# Patient Record
Sex: MALE | Race: BLACK OR AFRICAN AMERICAN | Employment: UNEMPLOYED | URBAN - METROPOLITAN AREA
[De-identification: names, ages, dates, MRNs, and addresses within clinical notes are randomized per-mention and may not be internally consistent; named-entity substitution may affect disease eponyms.]

---

## 2018-08-21 ENCOUNTER — HOSPITAL ENCOUNTER (EMERGENCY)
Age: 53
Discharge: ANOTHER HOSPITAL | End: 2018-08-22
Attending: EMERGENCY MEDICINE
Payer: MEDICARE

## 2018-08-21 ENCOUNTER — APPOINTMENT (OUTPATIENT)
Dept: GENERAL RADIOLOGY | Age: 53
End: 2018-08-21
Attending: PHYSICIAN ASSISTANT
Payer: MEDICARE

## 2018-08-21 ENCOUNTER — APPOINTMENT (OUTPATIENT)
Dept: CT IMAGING | Age: 53
End: 2018-08-21
Attending: EMERGENCY MEDICINE
Payer: MEDICARE

## 2018-08-21 ENCOUNTER — APPOINTMENT (OUTPATIENT)
Dept: CT IMAGING | Age: 53
End: 2018-08-21
Attending: PHYSICIAN ASSISTANT
Payer: MEDICARE

## 2018-08-21 DIAGNOSIS — R46.89 AGGRESSIVE BEHAVIOR: Primary | ICD-10-CM

## 2018-08-21 DIAGNOSIS — C71.9 GLIOMA OF BRAIN (HCC): ICD-10-CM

## 2018-08-21 LAB
ALBUMIN SERPL-MCNC: 3.5 G/DL (ref 3.5–5)
ALBUMIN/GLOB SERPL: 0.8 {RATIO} (ref 1.1–2.2)
ALP SERPL-CCNC: 85 U/L (ref 45–117)
ALT SERPL-CCNC: 15 U/L (ref 12–78)
AMPHET UR QL SCN: NEGATIVE
ANION GAP SERPL CALC-SCNC: 8 MMOL/L (ref 5–15)
APPEARANCE UR: CLEAR
AST SERPL-CCNC: 15 U/L (ref 15–37)
BACTERIA URNS QL MICRO: NEGATIVE /HPF
BARBITURATES UR QL SCN: NEGATIVE
BASOPHILS # BLD: 0.1 K/UL (ref 0–0.1)
BASOPHILS NFR BLD: 1 % (ref 0–1)
BENZODIAZ UR QL: NEGATIVE
BILIRUB SERPL-MCNC: 0.2 MG/DL (ref 0.2–1)
BILIRUB UR QL: NEGATIVE
BUN SERPL-MCNC: 9 MG/DL (ref 6–20)
BUN/CREAT SERPL: 8 (ref 12–20)
CALCIUM SERPL-MCNC: 9.7 MG/DL (ref 8.5–10.1)
CANNABINOIDS UR QL SCN: POSITIVE
CHLORIDE SERPL-SCNC: 100 MMOL/L (ref 97–108)
CO2 SERPL-SCNC: 29 MMOL/L (ref 21–32)
COCAINE UR QL SCN: POSITIVE
COLOR UR: NORMAL
CREAT SERPL-MCNC: 1.08 MG/DL (ref 0.7–1.3)
DIFFERENTIAL METHOD BLD: ABNORMAL
DRUG SCRN COMMENT,DRGCM: ABNORMAL
EOSINOPHIL # BLD: 0.2 K/UL (ref 0–0.4)
EOSINOPHIL NFR BLD: 1 % (ref 0–7)
EPITH CASTS URNS QL MICRO: NORMAL /LPF
ERYTHROCYTE [DISTWIDTH] IN BLOOD BY AUTOMATED COUNT: 13.5 % (ref 11.5–14.5)
ETHANOL SERPL-MCNC: <10 MG/DL
GLOBULIN SER CALC-MCNC: 4.2 G/DL (ref 2–4)
GLUCOSE SERPL-MCNC: 103 MG/DL (ref 65–100)
GLUCOSE UR STRIP.AUTO-MCNC: NEGATIVE MG/DL
HCT VFR BLD AUTO: 40.8 % (ref 36.6–50.3)
HGB BLD-MCNC: 13.5 G/DL (ref 12.1–17)
HGB UR QL STRIP: NEGATIVE
IMM GRANULOCYTES # BLD: 0.1 K/UL (ref 0–0.04)
IMM GRANULOCYTES NFR BLD AUTO: 0 % (ref 0–0.5)
KETONES UR QL STRIP.AUTO: NEGATIVE MG/DL
LEUKOCYTE ESTERASE UR QL STRIP.AUTO: NEGATIVE
LYMPHOCYTES # BLD: 3.4 K/UL (ref 0.8–3.5)
LYMPHOCYTES NFR BLD: 24 % (ref 12–49)
MCH RBC QN AUTO: 29.3 PG (ref 26–34)
MCHC RBC AUTO-ENTMCNC: 33.1 G/DL (ref 30–36.5)
MCV RBC AUTO: 88.5 FL (ref 80–99)
METHADONE UR QL: NEGATIVE
MONOCYTES # BLD: 0.9 K/UL (ref 0–1)
MONOCYTES NFR BLD: 6 % (ref 5–13)
NEUTS SEG # BLD: 9.5 K/UL (ref 1.8–8)
NEUTS SEG NFR BLD: 67 % (ref 32–75)
NITRITE UR QL STRIP.AUTO: NEGATIVE
NRBC # BLD: 0 K/UL (ref 0–0.01)
NRBC BLD-RTO: 0 PER 100 WBC
OPIATES UR QL: NEGATIVE
PCP UR QL: NEGATIVE
PH UR STRIP: 6.5 [PH] (ref 5–8)
PLATELET # BLD AUTO: 361 K/UL (ref 150–400)
PMV BLD AUTO: 9.3 FL (ref 8.9–12.9)
POTASSIUM SERPL-SCNC: 4.1 MMOL/L (ref 3.5–5.1)
PROT SERPL-MCNC: 7.7 G/DL (ref 6.4–8.2)
PROT UR STRIP-MCNC: NEGATIVE MG/DL
RBC # BLD AUTO: 4.61 M/UL (ref 4.1–5.7)
RBC #/AREA URNS HPF: NORMAL /HPF (ref 0–5)
SODIUM SERPL-SCNC: 137 MMOL/L (ref 136–145)
SP GR UR REFRACTOMETRY: 1.01 (ref 1–1.03)
UA: UC IF INDICATED,UAUC: NORMAL
UROBILINOGEN UR QL STRIP.AUTO: 0.2 EU/DL (ref 0.2–1)
VALPROATE SERPL-MCNC: 19 UG/ML (ref 50–100)
WBC # BLD AUTO: 14.1 K/UL (ref 4.1–11.1)
WBC URNS QL MICRO: NORMAL /HPF (ref 0–4)

## 2018-08-21 PROCEDURE — 36415 COLL VENOUS BLD VENIPUNCTURE: CPT | Performed by: PHYSICIAN ASSISTANT

## 2018-08-21 PROCEDURE — 85025 COMPLETE CBC W/AUTO DIFF WBC: CPT | Performed by: PHYSICIAN ASSISTANT

## 2018-08-21 PROCEDURE — 80307 DRUG TEST PRSMV CHEM ANLYZR: CPT | Performed by: PHYSICIAN ASSISTANT

## 2018-08-21 PROCEDURE — 80053 COMPREHEN METABOLIC PANEL: CPT | Performed by: PHYSICIAN ASSISTANT

## 2018-08-21 PROCEDURE — 74011636320 HC RX REV CODE- 636/320: Performed by: EMERGENCY MEDICINE

## 2018-08-21 PROCEDURE — 81001 URINALYSIS AUTO W/SCOPE: CPT | Performed by: PHYSICIAN ASSISTANT

## 2018-08-21 PROCEDURE — 71046 X-RAY EXAM CHEST 2 VIEWS: CPT

## 2018-08-21 PROCEDURE — 70496 CT ANGIOGRAPHY HEAD: CPT

## 2018-08-21 PROCEDURE — 80164 ASSAY DIPROPYLACETIC ACD TOT: CPT | Performed by: PHYSICIAN ASSISTANT

## 2018-08-21 PROCEDURE — 70450 CT HEAD/BRAIN W/O DYE: CPT

## 2018-08-21 PROCEDURE — 74011250637 HC RX REV CODE- 250/637: Performed by: PHYSICIAN ASSISTANT

## 2018-08-21 PROCEDURE — 99285 EMERGENCY DEPT VISIT HI MDM: CPT

## 2018-08-21 RX ORDER — DIVALPROEX SODIUM 500 MG/1
500 TABLET, DELAYED RELEASE ORAL
Status: COMPLETED | OUTPATIENT
Start: 2018-08-21 | End: 2018-08-21

## 2018-08-21 RX ORDER — OMEPRAZOLE 10 MG/1
20 CAPSULE, DELAYED RELEASE ORAL DAILY
COMMUNITY
End: 2018-11-24

## 2018-08-21 RX ORDER — TRAZODONE HYDROCHLORIDE 100 MG/1
100 TABLET ORAL
COMMUNITY
End: 2018-12-13

## 2018-08-21 RX ORDER — SODIUM CHLORIDE 0.9 % (FLUSH) 0.9 %
10 SYRINGE (ML) INJECTION
Status: COMPLETED | OUTPATIENT
Start: 2018-08-21 | End: 2018-08-21

## 2018-08-21 RX ORDER — MELATONIN
1000 DAILY
COMMUNITY

## 2018-08-21 RX ORDER — METFORMIN HYDROCHLORIDE 1000 MG/1
1000 TABLET ORAL 2 TIMES DAILY WITH MEALS
COMMUNITY
End: 2018-12-13

## 2018-08-21 RX ORDER — DOCUSATE SODIUM 100 MG/1
100 CAPSULE, LIQUID FILLED ORAL 2 TIMES DAILY
COMMUNITY

## 2018-08-21 RX ADMIN — DIVALPROEX SODIUM 500 MG: 500 TABLET, DELAYED RELEASE ORAL at 21:00

## 2018-08-21 RX ADMIN — Medication 10 ML: at 21:51

## 2018-08-21 RX ADMIN — IOPAMIDOL 100 ML: 755 INJECTION, SOLUTION INTRAVENOUS at 21:51

## 2018-08-21 NOTE — ED NOTES
Ms. Stanford Thaniadeena at [facility] may be reached at [phone]. She may have more information about patient's baseline and recent medications.
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
## 2018-08-21 NOTE — ED NOTES
Pt presents to ED ambulatory with [employer] complaining of behavioral health disturbance. Pt is alert and oriented x 2 (person and situation), RR even and unlabored, skin is warm and dry. Assessment completed and pt updated on plan of care. Dev YOU, HI, hallucinations. Gave pt a bag dinner, be he states he isn't hungry at this time. Pt is cooperative with blood draws and CT/Xray, he reports he just wants 'to go home and sleep in my own bed'. Emergency Department Nursing Plan of Care       The Nursing Plan of Care is developed from the Nursing assessment and Emergency Department Attending provider initial evaluation. The plan of care may be reviewed in the ED Provider note.     The Plan of Care was developed with the following considerations:   Patient / Family readiness to learn indicated by:verbalized understanding  Persons(s) to be included in education: patient  Barriers to Learning/Limitations:Cognitive/physical impairment:    Signed     Britta Castro RN    8/21/2018   7:16 PM
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
## 2018-08-22 VITALS
BODY MASS INDEX: 20.16 KG/M2 | RESPIRATION RATE: 18 BRPM | SYSTOLIC BLOOD PRESSURE: 138 MMHG | WEIGHT: 133 LBS | HEIGHT: 68 IN | DIASTOLIC BLOOD PRESSURE: 82 MMHG | TEMPERATURE: 98.1 F | HEART RATE: 68 BPM | OXYGEN SATURATION: 100 %

## 2018-08-22 PROCEDURE — 93005 ELECTROCARDIOGRAM TRACING: CPT

## 2018-08-22 NOTE — ED NOTES
TRANSFER - OUT REPORT:    Verbal report given to Thee RN(name) on Doyle Mark  being transferred to [facility] state(unit) for routine progression of care       Report consisted of patients Situation, Background, Assessment and   Recommendations(SBAR). Information from the following report(s) SBAR, Kardex, ED Summary, Recent Results and Med Rec Status was reviewed with the receiving nurse. Lines:       Opportunity for questions and clarification was provided.       Patient transported with:LUDWIN

## 2018-08-22 NOTE — ED NOTES
Pt continues to rest quietly in bed in position of comfort. Updated on plan of care. Call bell within reach. [employer] remains at the bedside.

## 2018-08-22 NOTE — ED NOTES
Bedside, Verbal and Written shift change report given to Britta BUTLER and Akosua Mendez (oncoming nurse) by Sheila Jacobs RN (offgoing nurse). Report included the following information SBAR, Kardex, ED Summary, Intake/Output, MAR, Recent Results and Med Rec Status. Laila left a medication list belonging to pt and said we are waiting on a call to verify the current medications.

## 2018-08-22 NOTE — ED NOTES
Bedside and Verbal shift change report given to [facility] (oncoming nurse) by me (offgoing nurse). Report included the following information SBAR, Kardex and ED Summary.

## 2018-08-22 NOTE — ED NOTES
Spoke with bed board and they said that due to pt's hx, he will not be accepted to [facility] behavioral health and that they are looking for an alternate place.

## 2018-08-22 NOTE — ED NOTES
Notified radiology that pt has R 20 g and is ready for CTA. Magdy Benítez said they has to call in some one. MD made aware.

## 2018-08-22 NOTE — ED NOTES
Bedside and Verbal shift change report given to [id number] (oncoming nurse) by Mahad BUTLER (offgoing nurse). Report included the following information SBAR, Kardex, ED Summary and MAR.

## 2018-08-22 NOTE — ED NOTES
Spoke with Haylie Hatch at [facility], no one will take pt so they will be calling [facility].

## 2018-08-24 LAB
ATRIAL RATE: 66 BPM
CALCULATED P AXIS, ECG09: 49 DEGREES
CALCULATED R AXIS, ECG10: 14 DEGREES
CALCULATED T AXIS, ECG11: 4 DEGREES
DIAGNOSIS, 93000: NORMAL
P-R INTERVAL, ECG05: 224 MS
Q-T INTERVAL, ECG07: 388 MS
QRS DURATION, ECG06: 94 MS
QTC CALCULATION (BEZET), ECG08: 406 MS
VENTRICULAR RATE, ECG03: 66 BPM

## 2018-11-24 ENCOUNTER — APPOINTMENT (OUTPATIENT)
Dept: CT IMAGING | Age: 53
DRG: 054 | End: 2018-11-24
Attending: HOSPITALIST
Payer: MEDICARE

## 2018-11-24 ENCOUNTER — HOSPITAL ENCOUNTER (INPATIENT)
Age: 53
LOS: 19 days | Discharge: HOME | DRG: 054 | End: 2018-12-13
Attending: EMERGENCY MEDICINE | Admitting: HOSPITALIST
Payer: MEDICARE

## 2018-11-24 ENCOUNTER — APPOINTMENT (OUTPATIENT)
Dept: CT IMAGING | Age: 53
DRG: 054 | End: 2018-11-24
Attending: EMERGENCY MEDICINE
Payer: MEDICARE

## 2018-11-24 DIAGNOSIS — I61.9 NONTRAUMATIC INTRACEREBRAL HEMORRHAGE, UNSPECIFIED CEREBRAL LOCATION, UNSPECIFIED LATERALITY (HCC): ICD-10-CM

## 2018-11-24 DIAGNOSIS — R41.0 CONFUSION: ICD-10-CM

## 2018-11-24 DIAGNOSIS — G93.89 BRAIN MASS: Primary | ICD-10-CM

## 2018-11-24 DIAGNOSIS — R29.90 STROKE-LIKE SYMPTOMS: ICD-10-CM

## 2018-11-24 DIAGNOSIS — R53.81 PHYSICAL DEBILITY: ICD-10-CM

## 2018-11-24 DIAGNOSIS — R53.1 LEFT-SIDED WEAKNESS: ICD-10-CM

## 2018-11-24 LAB
ALBUMIN SERPL-MCNC: 3.6 G/DL (ref 3.5–5)
ALBUMIN/GLOB SERPL: 1 {RATIO} (ref 1.1–2.2)
ALP SERPL-CCNC: 64 U/L (ref 45–117)
ALT SERPL-CCNC: 16 U/L (ref 12–78)
ANION GAP SERPL CALC-SCNC: 8 MMOL/L (ref 5–15)
AST SERPL-CCNC: 17 U/L (ref 15–37)
BASOPHILS # BLD: 0.1 K/UL (ref 0–0.1)
BASOPHILS NFR BLD: 1 % (ref 0–1)
BILIRUB SERPL-MCNC: 0.4 MG/DL (ref 0.2–1)
BUN SERPL-MCNC: 15 MG/DL (ref 6–20)
BUN/CREAT SERPL: 15 (ref 12–20)
CALCIUM SERPL-MCNC: 8.6 MG/DL (ref 8.5–10.1)
CHLORIDE SERPL-SCNC: 102 MMOL/L (ref 97–108)
CO2 SERPL-SCNC: 28 MMOL/L (ref 21–32)
COMMENT, HOLDF: NORMAL
CREAT SERPL-MCNC: 0.99 MG/DL (ref 0.7–1.3)
DIFFERENTIAL METHOD BLD: ABNORMAL
EOSINOPHIL # BLD: 0.1 K/UL (ref 0–0.4)
EOSINOPHIL NFR BLD: 1 % (ref 0–7)
ERYTHROCYTE [DISTWIDTH] IN BLOOD BY AUTOMATED COUNT: 12.8 % (ref 11.5–14.5)
GLOBULIN SER CALC-MCNC: 3.7 G/DL (ref 2–4)
GLUCOSE SERPL-MCNC: 119 MG/DL (ref 65–100)
HCT VFR BLD AUTO: 35.6 % (ref 36.6–50.3)
HGB BLD-MCNC: 12 G/DL (ref 12.1–17)
IMM GRANULOCYTES # BLD: 0 K/UL (ref 0–0.04)
IMM GRANULOCYTES NFR BLD AUTO: 0 % (ref 0–0.5)
LYMPHOCYTES # BLD: 2.8 K/UL (ref 0.8–3.5)
LYMPHOCYTES NFR BLD: 26 % (ref 12–49)
MCH RBC QN AUTO: 29.8 PG (ref 26–34)
MCHC RBC AUTO-ENTMCNC: 33.7 G/DL (ref 30–36.5)
MCV RBC AUTO: 88.3 FL (ref 80–99)
MONOCYTES # BLD: 1 K/UL (ref 0–1)
MONOCYTES NFR BLD: 9 % (ref 5–13)
NEUTS SEG # BLD: 6.9 K/UL (ref 1.8–8)
NEUTS SEG NFR BLD: 63 % (ref 32–75)
NRBC # BLD: 0 K/UL (ref 0–0.01)
NRBC BLD-RTO: 0 PER 100 WBC
PLATELET # BLD AUTO: 217 K/UL (ref 150–400)
PMV BLD AUTO: 10 FL (ref 8.9–12.9)
POTASSIUM SERPL-SCNC: 3.6 MMOL/L (ref 3.5–5.1)
PROT SERPL-MCNC: 7.3 G/DL (ref 6.4–8.2)
RBC # BLD AUTO: 4.03 M/UL (ref 4.1–5.7)
SAMPLES BEING HELD,HOLD: NORMAL
SODIUM SERPL-SCNC: 138 MMOL/L (ref 136–145)
TROPONIN I SERPL-MCNC: <0.05 NG/ML
WBC # BLD AUTO: 10.9 K/UL (ref 4.1–11.1)

## 2018-11-24 PROCEDURE — 36415 COLL VENOUS BLD VENIPUNCTURE: CPT

## 2018-11-24 PROCEDURE — 74011250636 HC RX REV CODE- 250/636: Performed by: EMERGENCY MEDICINE

## 2018-11-24 PROCEDURE — 99285 EMERGENCY DEPT VISIT HI MDM: CPT

## 2018-11-24 PROCEDURE — 74011000258 HC RX REV CODE- 258: Performed by: EMERGENCY MEDICINE

## 2018-11-24 PROCEDURE — 74011000250 HC RX REV CODE- 250: Performed by: EMERGENCY MEDICINE

## 2018-11-24 PROCEDURE — 93005 ELECTROCARDIOGRAM TRACING: CPT

## 2018-11-24 PROCEDURE — 74011250637 HC RX REV CODE- 250/637: Performed by: EMERGENCY MEDICINE

## 2018-11-24 PROCEDURE — 85025 COMPLETE CBC W/AUTO DIFF WBC: CPT

## 2018-11-24 PROCEDURE — 80053 COMPREHEN METABOLIC PANEL: CPT

## 2018-11-24 PROCEDURE — 70460 CT HEAD/BRAIN W/DYE: CPT

## 2018-11-24 PROCEDURE — 70450 CT HEAD/BRAIN W/O DYE: CPT

## 2018-11-24 PROCEDURE — 65610000006 HC RM INTENSIVE CARE

## 2018-11-24 PROCEDURE — 84484 ASSAY OF TROPONIN QUANT: CPT

## 2018-11-24 PROCEDURE — 74011636320 HC RX REV CODE- 636/320: Performed by: RADIOLOGY

## 2018-11-24 PROCEDURE — 74011000258 HC RX REV CODE- 258: Performed by: RADIOLOGY

## 2018-11-24 RX ORDER — BENZTROPINE MESYLATE 1 MG/1
1 TABLET ORAL 2 TIMES DAILY
COMMUNITY

## 2018-11-24 RX ORDER — IBUPROFEN 400 MG/1
TABLET ORAL
COMMUNITY

## 2018-11-24 RX ORDER — DIVALPROEX SODIUM 250 MG/1
500 TABLET, DELAYED RELEASE ORAL
COMMUNITY

## 2018-11-24 RX ORDER — PHENOL/SODIUM PHENOLATE
20 AEROSOL, SPRAY (ML) MUCOUS MEMBRANE DAILY
COMMUNITY

## 2018-11-24 RX ORDER — LACTULOSE 10 G/15ML
SOLUTION ORAL; RECTAL
COMMUNITY

## 2018-11-24 RX ORDER — DEXAMETHASONE SODIUM PHOSPHATE 10 MG/ML
10 INJECTION INTRAMUSCULAR; INTRAVENOUS
Status: COMPLETED | OUTPATIENT
Start: 2018-11-24 | End: 2018-11-24

## 2018-11-24 RX ORDER — SENNOSIDES 8.6 MG/1
1 TABLET ORAL
COMMUNITY

## 2018-11-24 RX ORDER — SODIUM CHLORIDE 0.9 % (FLUSH) 0.9 %
10 SYRINGE (ML) INJECTION
Status: COMPLETED | OUTPATIENT
Start: 2018-11-24 | End: 2018-11-24

## 2018-11-24 RX ORDER — MANNITOL 20 G/100ML
0.5 INJECTION, SOLUTION INTRAVENOUS
Status: COMPLETED | OUTPATIENT
Start: 2018-11-24 | End: 2018-11-24

## 2018-11-24 RX ORDER — DIVALPROEX SODIUM 250 MG/1
250 TABLET, EXTENDED RELEASE ORAL DAILY
COMMUNITY
End: 2018-12-13

## 2018-11-24 RX ORDER — HALOPERIDOL 5 MG/1
5 TABLET ORAL 2 TIMES DAILY
COMMUNITY

## 2018-11-24 RX ADMIN — DEXAMETHASONE SODIUM PHOSPHATE 10 MG: 10 INJECTION, SOLUTION INTRAMUSCULAR; INTRAVENOUS at 19:51

## 2018-11-24 RX ADMIN — IOPAMIDOL 100 ML: 612 INJECTION, SOLUTION INTRAVENOUS at 21:24

## 2018-11-24 RX ADMIN — SODIUM CHLORIDE 1000 MG: 900 INJECTION, SOLUTION INTRAVENOUS at 19:57

## 2018-11-24 RX ADMIN — Medication 10 ML: at 21:24

## 2018-11-24 RX ADMIN — MANNITOL 29.5 G: 20 INJECTION, SOLUTION INTRAVENOUS at 20:08

## 2018-11-24 RX ADMIN — SODIUM CHLORIDE 100 ML: 900 INJECTION, SOLUTION INTRAVENOUS at 21:24

## 2018-11-24 NOTE — ED PROVIDER NOTES
HPI  
 
  47y M here by EMS for 1 week of slurred speech, L facial droop, LUE weakness, and LLE weakness. When EMS arrived, these sx's were present and pt was slightly confused (couldn't say how old he was) but then in walking out to the ambulance his sx's all resolved and he had an uneventful trip to the ED. He has no complaints at this time. Past Medical History:  
Diagnosis Date  Hypercholesteremia  Hyperlipidemia  Hypertension  Impaction, bowel ([facility] [address]  MR (mental retardation)  Psychiatric disorder  Schizophrenia ([facility] [address] History reviewed. No pertinent surgical history. History reviewed. No pertinent family history. Social History Socioeconomic History  Marital status: SINGLE Spouse name: Not on file  Number of children: Not on file  Years of education: Not on file  Highest education level: Not on file Social Needs  Financial resource strain: Not on file  Food insecurity - worry: Not on file  Food insecurity - inability: Not on file  Transportation needs - medical: Not on file  Transportation needs - non-medical: Not on file Occupational History  Not on file Tobacco Use  Smoking status: Never Smoker  Smokeless tobacco: Never Used Substance and Sexual Activity  Alcohol use: No  
 Drug use: No  
 Sexual activity: Not on file Other Topics Concern  Not on file Social History Narrative  Not on file ALLERGIES: Patient has no known allergies. Review of Systems Review of Systems Constitutional: (-) weight loss. HEENT: (-) stiff neck Eyes: (-) discharge. Respiratory: (-) cough. Cardiovascular: (-) syncope. Gastrointestinal: (-) blood in stool. Genitourinary: (-) hematuria. Musculoskeletal: (-) myalgias. Neurological: (-) seizure. Skin: (-) petechiae Lymph/Immunologic: (-) enlarged lymph nodes All other systems reviewed and are negative. There were no vitals filed for this visit. Physical Exam Nursing note and vitals reviewed. Constitutional: oriented to person, place, and time. appears well-developed and well-nourished. No distress. Head: Normocephalic and atraumatic. Sclera anicteric Nose: No rhinorrhea Mouth/Throat: Oropharynx is clear and moist. Pharynx normal 
Eyes: Conjunctivae are normal. Pupils are equal, round, and reactive to light. Right eye exhibits no discharge. Left eye exhibits no discharge. Neck: Painless normal range of motion. Neck supple. No LAD. Cardiovascular: Normal rate, regular rhythm, normal heart sounds and intact distal pulses. Exam reveals no gallop and no friction rub. No murmur heard. Pulmonary/Chest:  No respiratory distress. No wheezes. No rales. No rhonchi. No increased work of breathing. No accessory muscle use. Good air exchange throughout. Abdominal: soft, non-tender, no rebound or guarding. No hepatosplenomegaly. Normal bowel sounds throughout. Back: no tenderness to palpation, no deformities, no CVA tenderness Extremities/Musculoskeletal: Normal range of motion. no tenderness. No edema. Distal extremities are neurovasc intact. Lymphadenopathy:   No adenopathy. Neurological:  CN II-XII intact, 5/5 strength throughout, nl sensation throughout, nl cerebellar function, nl speech/fluency, nl gait/station, no pronator drift. Normal mental status. Skin: Skin is warm and dry. No rash noted. No pallor. MDM 47y M here with stroke-like sx's x 1 week that have now resolved. Will need workup and admission. Procedures ED EKG interpretation: 
Rhythm: sinus rhythm; and regular . Rate (approx.): 63; Axis: normal; P wave: normal; QRS interval: normal ; ST/T wave: normal;  This EKG was interpreted by Rico Lau MD,ED Provider. 7:53 PM 
Large CNS mass with hemorrhage on CT. Also with midline shift.  Spoke with [facility] - plan for mannitol, decadron, and keppra. Pt's contact in the chart is apparently  and there are no other contacts. Tried to call the number listed but no one answered. Called [facility] to see if he has received care down there for this but has not. Total critical care time (excluding procedures): 35 min

## 2018-11-24 NOTE — ED TRIAGE NOTES
Patient arrives via EMS from [facility] for Fillistor[facility] like symptoms for a week. \" Upon EMS arrival patient had left sided leg weakness and left arm drift, and slurred speech. Approx a minute later patient stated \"I feel better. \" No slurred speech noted and  were equal.  en route.

## 2018-11-25 LAB
ATRIAL RATE: 63 BPM
CALCULATED P AXIS, ECG09: 58 DEGREES
CALCULATED R AXIS, ECG10: 38 DEGREES
CALCULATED T AXIS, ECG11: 29 DEGREES
DIAGNOSIS, 93000: NORMAL
GLUCOSE BLD STRIP.AUTO-MCNC: 101 MG/DL (ref 65–100)
GLUCOSE BLD STRIP.AUTO-MCNC: 114 MG/DL (ref 65–100)
GLUCOSE BLD STRIP.AUTO-MCNC: 136 MG/DL (ref 65–100)
P-R INTERVAL, ECG05: 214 MS
Q-T INTERVAL, ECG07: 394 MS
QRS DURATION, ECG06: 88 MS
QTC CALCULATION (BEZET), ECG08: 403 MS
SERVICE CMNT-IMP: ABNORMAL
VENTRICULAR RATE, ECG03: 63 BPM

## 2018-11-25 PROCEDURE — 74011250637 HC RX REV CODE- 250/637: Performed by: FAMILY MEDICINE

## 2018-11-25 PROCEDURE — 74011250636 HC RX REV CODE- 250/636: Performed by: SPECIALIST

## 2018-11-25 PROCEDURE — 74011250636 HC RX REV CODE- 250/636: Performed by: HOSPITALIST

## 2018-11-25 PROCEDURE — 74011250637 HC RX REV CODE- 250/637: Performed by: HOSPITALIST

## 2018-11-25 PROCEDURE — 82962 GLUCOSE BLOOD TEST: CPT

## 2018-11-25 PROCEDURE — 74011250637 HC RX REV CODE- 250/637: Performed by: SPECIALIST

## 2018-11-25 PROCEDURE — 65610000006 HC RM INTENSIVE CARE

## 2018-11-25 RX ORDER — BENZTROPINE MESYLATE 1 MG/1
1 TABLET ORAL 2 TIMES DAILY
Status: DISCONTINUED | OUTPATIENT
Start: 2018-11-25 | End: 2018-12-13 | Stop reason: HOSPADM

## 2018-11-25 RX ORDER — SODIUM CHLORIDE 0.9 % (FLUSH) 0.9 %
5-10 SYRINGE (ML) INJECTION EVERY 8 HOURS
Status: DISCONTINUED | OUTPATIENT
Start: 2018-11-25 | End: 2018-12-07

## 2018-11-25 RX ORDER — LORAZEPAM 1 MG/1
1 TABLET ORAL
Status: COMPLETED | OUTPATIENT
Start: 2018-11-25 | End: 2018-11-25

## 2018-11-25 RX ORDER — LACTULOSE 10 G/15ML
15 SOLUTION ORAL; RECTAL 2 TIMES DAILY
Status: DISCONTINUED | OUTPATIENT
Start: 2018-11-25 | End: 2018-11-27

## 2018-11-25 RX ORDER — MAGNESIUM SULFATE 100 %
4 CRYSTALS MISCELLANEOUS AS NEEDED
Status: DISCONTINUED | OUTPATIENT
Start: 2018-11-25 | End: 2018-12-13 | Stop reason: HOSPADM

## 2018-11-25 RX ORDER — SODIUM CHLORIDE 0.9 % (FLUSH) 0.9 %
5-10 SYRINGE (ML) INJECTION AS NEEDED
Status: DISCONTINUED | OUTPATIENT
Start: 2018-11-25 | End: 2018-12-13 | Stop reason: HOSPADM

## 2018-11-25 RX ORDER — DEXTROSE 50 % IN WATER (D50W) INTRAVENOUS SYRINGE
12.5-25 AS NEEDED
Status: DISCONTINUED | OUTPATIENT
Start: 2018-11-25 | End: 2018-12-13 | Stop reason: HOSPADM

## 2018-11-25 RX ORDER — DEXAMETHASONE SODIUM PHOSPHATE 10 MG/ML
9 INJECTION INTRAMUSCULAR; INTRAVENOUS EVERY 6 HOURS
Status: DISCONTINUED | OUTPATIENT
Start: 2018-11-25 | End: 2018-11-25 | Stop reason: SDDI

## 2018-11-25 RX ORDER — PHENOL/SODIUM PHENOLATE
20 AEROSOL, SPRAY (ML) MUCOUS MEMBRANE DAILY
Status: DISCONTINUED | OUTPATIENT
Start: 2018-11-25 | End: 2018-11-25 | Stop reason: CLARIF

## 2018-11-25 RX ORDER — LEVETIRACETAM 500 MG/1
1000 TABLET ORAL 2 TIMES DAILY
Status: DISCONTINUED | OUTPATIENT
Start: 2018-11-25 | End: 2018-12-13 | Stop reason: HOSPADM

## 2018-11-25 RX ORDER — DEXAMETHASONE 4 MG/1
6 TABLET ORAL EVERY 6 HOURS
Status: DISCONTINUED | OUTPATIENT
Start: 2018-11-25 | End: 2018-11-29

## 2018-11-25 RX ORDER — DIVALPROEX SODIUM 250 MG/1
250 TABLET, EXTENDED RELEASE ORAL DAILY
Status: DISCONTINUED | OUTPATIENT
Start: 2018-11-25 | End: 2018-12-13 | Stop reason: HOSPADM

## 2018-11-25 RX ORDER — PANTOPRAZOLE SODIUM 40 MG/1
40 TABLET, DELAYED RELEASE ORAL
Status: DISCONTINUED | OUTPATIENT
Start: 2018-11-25 | End: 2018-12-13 | Stop reason: HOSPADM

## 2018-11-25 RX ORDER — DOCUSATE SODIUM 100 MG/1
100 CAPSULE, LIQUID FILLED ORAL 2 TIMES DAILY
Status: DISCONTINUED | OUTPATIENT
Start: 2018-11-25 | End: 2018-12-13 | Stop reason: HOSPADM

## 2018-11-25 RX ORDER — HALOPERIDOL 5 MG/1
5 TABLET ORAL 2 TIMES DAILY
Status: DISCONTINUED | OUTPATIENT
Start: 2018-11-25 | End: 2018-12-13 | Stop reason: HOSPADM

## 2018-11-25 RX ORDER — DIVALPROEX SODIUM 500 MG/1
500 TABLET, DELAYED RELEASE ORAL
Status: DISCONTINUED | OUTPATIENT
Start: 2018-11-25 | End: 2018-12-13 | Stop reason: HOSPADM

## 2018-11-25 RX ADMIN — DEXAMETHASONE SODIUM PHOSPHATE 9 MG: 10 INJECTION, SOLUTION INTRAMUSCULAR; INTRAVENOUS at 07:03

## 2018-11-25 RX ADMIN — PANTOPRAZOLE SODIUM 40 MG: 40 TABLET, DELAYED RELEASE ORAL at 12:20

## 2018-11-25 RX ADMIN — LACTULOSE 10 G: 20 SOLUTION ORAL at 15:48

## 2018-11-25 RX ADMIN — BENZTROPINE MESYLATE 1 MG: 1 TABLET ORAL at 12:19

## 2018-11-25 RX ADMIN — BENZTROPINE MESYLATE 1 MG: 1 TABLET ORAL at 15:45

## 2018-11-25 RX ADMIN — DIVALPROEX SODIUM 250 MG: 250 TABLET, EXTENDED RELEASE ORAL at 12:21

## 2018-11-25 RX ADMIN — DIVALPROEX SODIUM 500 MG: 500 TABLET, DELAYED RELEASE ORAL at 22:20

## 2018-11-25 RX ADMIN — DEXAMETHASONE SODIUM PHOSPHATE 9 MG: 10 INJECTION, SOLUTION INTRAMUSCULAR; INTRAVENOUS at 02:38

## 2018-11-25 RX ADMIN — Medication 10 ML: at 02:38

## 2018-11-25 RX ADMIN — HALOPERIDOL 5 MG: 5 TABLET ORAL at 12:20

## 2018-11-25 RX ADMIN — LORAZEPAM 1 MG: 1 TABLET ORAL at 16:08

## 2018-11-25 RX ADMIN — Medication 10 ML: at 07:03

## 2018-11-25 RX ADMIN — HALOPERIDOL 5 MG: 5 TABLET ORAL at 15:47

## 2018-11-25 RX ADMIN — DOCUSATE SODIUM 100 MG: 100 CAPSULE, LIQUID FILLED ORAL at 15:46

## 2018-11-25 RX ADMIN — LEVETIRACETAM 1000 MG: 500 TABLET ORAL at 22:20

## 2018-11-25 RX ADMIN — DOCUSATE SODIUM 100 MG: 100 CAPSULE, LIQUID FILLED ORAL at 12:21

## 2018-11-25 RX ADMIN — DEXAMETHASONE 6 MG: 4 TABLET ORAL at 22:20

## 2018-11-25 NOTE — CONSULTS
[address]  CONSULTATION    Mathieu Barth  MR#: [id number]  : 1965  ACCOUNT #: [de-identified]   DATE OF SERVICE: 2018    REASON FOR CONSULTATION:  Brain tumor. HISTORY OF PRESENT ILLNESS:  This is a 58-year-old gentleman with a history of mental retardation as well as schizophrenia. He is a resident at [facility]. The staff there called emergency medical services when they noticed some left-sided face weakness and intermittent difficulty with his left side. He was brought to the emergency room. The patient says he has noticed some difficulty with these symptoms, but overall says that he feels fine. He denies any headache, denies any nausea or vomiting. 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 He was admitted to the ICU. We attempted to get an MRI of his brain which he would not tolerate. Therefore, a CT scan with contrast was performed. He is a very poor historian. He has no insight into his current condition. He denies that he has any brain tumor. He has not had any seizure activity. He did pull out all of his IV's and is requesting to leave. PAST MEDICAL HISTORY:  As mentioned in history of present illness and hypertension and hyperlipidemia    PAST SURGICAL HISTORY:  Incision and drainage of perirectal abscess. SOCIAL HISTORY:  Single. No documented history of tobacco or alcohol use.     MEDICATIONS PRIOR TO ADMISSION:  Cogentin 1 mg b.i.d., vitamin D 3000 units every day, Depakote 500 mg at bedtime, 250 mg every day, Colace 100 mg b.i.d., Haldol 5 mg b.i.d., Motrin 400 mg q.6 hours p.r.n., lactulose 10 g as needed, Prinivil 10 mg every day, Glucophage 1000 mg b.i.d., omeprazole 20 mg every day, Metamucil twice a day, senna as needed, Zocor 10 mg p.o. at bedtime, no longer taking Geodon, trazodone 100 mg p.o. at bedtime p.r.n. ALLERGIES:  NO KNOWN DRUG ALLERGIES. FAMILY HISTORY:  He is unable to provide any family history. REVIEW OF SYSTEMS:  He denies any headache, vision changes, hearing difficulty, chest pain, shortness of breath, abdominal pain, urinary dysfunction, numbness, vomiting. He does note some weakness in his face and some difficulty with his speech. PHYSICAL EXAMINATION:  VITAL SIGNS:  Afebrile, blood pressure 128/112, pulse 62. GENERAL:  A well-developed, well-nourished gentleman. NEUROLOGIC:  He is alert. He knows he is in the hospital, but he has very poor insight as to why. He is able to briskly follow commands. He has a conjugate gaze. He has left facial weakness. He has left pronator drift, but otherwise has good strength in his left upper extremity. Good strength to bedside exam in his left lower extremity. Sensory grossly intact. IMAGING STUDIES:  He has a head CT with contrast performed yesterday evening. This shows an extensive mass extending from the right parietal temporal lobe enhancing and partially cystic, definitely appears intraaxial associated with midline shift; most consistent with high-grade glioma. ASSESSMENT AND PLAN:  This is a 75-year-old gentleman with history of schizophrenia as well as mental retardation who has very poor insight into his current condition. He is adamant that he wants to leave and that he does not have a brain tumor. I have requested a psychological evaluation to determine competency. He does not have any next of kin. He has a brother, but he does not keep in touch with this brother. There is a report in the chart that there is a  for him who may be involved as well. At this point, I would not recommend that he leave while we discuss a treatment plan for him.       MD DANIEL Vick / KAIT  D: 11/25/2018 12:41     T: 11/25/2018 15:36  JOB #: [id number]

## 2018-11-25 NOTE — ROUTINE PROCESS
0730: Bedside, Verbal and Written shift change report given to Teresa Bellamy RN (oncoming nurse) by Francisca Campos RN (offgoing nurse). Report included the following information SBAR, Kardex, ED Summary, OR Summary, Procedure Summary, Intake/Output, MAR, Recent Results, Med Rec Status, Cardiac Rhythm NSR and Alarm Parameters . 1930: Bedside, Verbal and Written shift change report given to Magaly Nugent RN (oncoming nurse) by Teresa Bellamy RN (offgoing nurse). Report included the following information SBAR, Kardex, ED Summary, OR Summary, Procedure Summary, Intake/Output, MAR, Accordion, Recent Results, Med Rec Status and Cardiac Rhythm NSR.

## 2018-11-25 NOTE — CONSULTS
54yo with baseline mental retardation and psychiatric issues, lives in a group home, no known next of kin. He was noted to have left sided weakness by fellow residents at his group home and EMS brought him to the ED. Work up included a head CT without contrast that shows what may be amass extending throughout the right parietal and temporal lobe with edema and mass effect.m  He had a CT 8/21/2018 that showed a calcified mass left parietal without significant mass effect. I agree with admission for  Further workup including MRI of the brain if he will tolerate, if not CT with contrast.  In addition, I agree with decadron, keppra and 25gm of Mannitol. He will need hourly neuro checks and if he deteriorates may require emergent surgery. Will follow with you.

## 2018-11-25 NOTE — ROUTINE PROCESS
TRANSFER - OUT REPORT: 
 
Verbal report given to [facility] RN(name) on Mario Harrison  being transferred to ICU(unit) for routine progression of care Report consisted of patients Situation, Background, Assessment and  
Recommendations(SBAR). Information from the following report(s) SBAR, ED Summary, [facility] and Recent Results was reviewed with the receiving nurse. Lines:  
Peripheral IV 11/24/18 Left Antecubital (Active) Site Assessment Clean, dry, & intact 11/24/2018  5:58 PM  
Phlebitis Assessment 0 11/24/2018  5:58 PM  
Infiltration Assessment 0 11/24/2018  5:58 PM  
Dressing Status Clean, dry, & intact 11/24/2018  5:58 PM  
   
Peripheral IV 11/24/18 Right Antecubital (Active) Site Assessment Clean, dry, & intact 11/24/2018  8:08 PM  
Phlebitis Assessment 0 11/24/2018  8:08 PM  
Infiltration Assessment 0 11/24/2018  8:08 PM  
Dressing Status Clean, dry, & intact 11/24/2018  8:08 PM  
  
 
Opportunity for questions and clarification was provided. Patient transported with: 
 Monitor Registered Nurse

## 2018-11-25 NOTE — PROGRESS NOTES
Code [facility] called Pt declines to stay and proceed with the management ,pulled out his IV access and ready to leave On arrival,I again explained the clinical course and pt is agitated does not realize the severity of illness repeatedly saying \"I make my own decisions\" he does not believe he has Brain mass After long discussion,pt agreeable to take meds,including haldol 5mg & ativan 1mg ,howerever decline to receive further treatment and stay Called Psych, advised TDO, discussed with Dr Braden Gonzalez [phone] Called the [employer] [place] # [phone], discussed the case 
 she emailed me the petition form I filled the petition form and nurse faxed to her fax # [phone]  approved the petition for TDO, will fax petition for now ,will be followed by a hard copy via mail

## 2018-11-25 NOTE — CONSULTS
[address]  CONSULTATION    Geri Baer  MR#: [id number]  : 1965  ACCOUNT #: [de-identified]   DATE OF SERVICE: 2018    REQUESTING PHYSICIAN:  Hospitalist service. REASON FOR CONSULTATION:  Critical care management. CHIEF COMPLAINT:  Slurred speech. HISTORY OF PRESENT ILLNESS:  The patient is a 47-year-old gentleman with a history of schizophrenia who resides in [facility]. He was noted to have slurred speech and left-sided weakness per staff, and he presented to the emergency department. On CT scan, he was noted to have an increase of size of a right temporoparietal mass and was admitted to the ICU for Keppra, Decadron, and neuro checks. PAST MEDICAL HISTORY:  Schizophrenia. SOCIAL HISTORY:  Lives at [facility]. Nonsmoker. HOME MEDICATIONS:  Depakote, Colace, Haldol, lisinopril, metformin, Prilosec, simvastatin, Geodon, lactulose, and trazodone. ALLERGIES:  NO KNOWN DRUG ALLERGIES. REVIEW OF SYSTEMS:  GENERAL:  No fever, chills, nausea. EYES:  No double or blurred vision. EARS:  No tinnitus or deafness. NOSE:  No sinusitis, obstruction, or nosebleed. THROAT:  No soreness or hoarseness. CARDIOVASCULAR:  No chest pain or palpitations. PULMONARY:  No shortness of breath. GASTROINTESTINAL:  No nausea or vomiting. GENITOURINARY:  No dysuria or hematuria. ENDOCRINE:  No polyuria or polydipsia. LYMPHATIC:  No enlarged or painful lymph nodes. HEMATOLOGIC:  No easy bleeding or bruising. PHYSICAL EXAMINATION:  VITAL SIGNS:  Temperature is 98.3, pulse 62, respiration rate 23, blood pressure 147/117, sats are 99% on room air. GENERAL:  Well-developed, well-nourished,  gentleman. HEENT:  Normocephalic, atraumatic. NECK:  Supple, without mass, JVD, or carotid bruits. LYMPHATIC:  No palpable supraclavicular, submandibular, or cervical lymphadenopathy. LUNGS:  Clear to auscultation.   CARDIOVASCULAR:  Regular rhythm. ABDOMEN:  Soft, nontender. EXTREMITIES:  No cyanosis, clubbing. NEUROLOGIC:  Cranial nerves II-XII grossly intact. RADIOLOGIC DATA:  CT scan as noted above. ASSESSMENT:  Brain mass. DISCUSSION AND PLAN:  1. Neurosurgical evaluation and workup. 2.  Decadron. 3.  Keppra. 4.  ICU protocols.       MD ANGELES Rosario / JOSE C  D: 11/25/2018 09:06     T: 11/25/2018 10:29  JOB #: [id number]

## 2018-11-25 NOTE — PROGRESS NOTES
0745: Phoned hospitalist in regard of pt NPO status and PO meds. Normal diet approved. 0800: Pt removes ECG alarms and pulse O2. 
 
0815: Hospitalist per telephone approves PO meds if STAND passed by pt 
 
0820: Pt refuses STAND and PO meds. 0830: Pt asks to leave. Hospitalist paged. KERRIE Clau Powell paged [phone]. 
 
5619: Pt residence [facility] states pt is own POA. 3033: Neurosurgery paged. Dr Kenia Mccarthy at bedside. 1215: Pt agrees to take PO meds and refuses IV insertion. IV meds held. 1500: Code [place] d/t pt attempting to leave. Hospitalist called. On call Psych paged for TDO. Kenia Mccarthy paged. [facility] phoned, stated would page pt's care manager. [id number]: Hospitalist and pt's Care Manager from [facility] meet with pt. Pt agrees to take PO meds and stay in room. Pt not on ECG leads, pulse 02 or BP monitors. PCT sitter at bedside.

## 2018-11-25 NOTE — PROGRESS NOTES
Problem: Falls - Risk of 
Goal: *Absence of Falls Document Genevive Camera Fall Risk and appropriate interventions in the flowsheet. Fall Risk Interventions: 
  
  
  
Elimination Interventions: Call light in reach, Patient to call for help with toileting needs, Toileting schedule/hourly rounds History of Falls Interventions: Door open when patient unattended, Evaluate medications/consider consulting pharmacy, Room close to nurse's station Fall precautions are in place at this time Problem: Brain Tumor Day 1 Goal: *Neurologic stability Outcome: Progressing Towards Goal 
No neuro deficits noted, patient does have a hx of MR, but he follows all commands Goal: *Progress independence mobility/activities (eg: Mobility precautions) Outcome: Progressing Towards Goal 
Patient turns self in bed Goal: *Adequate oxygenation Outcome: Progressing Towards Goal 
Patient on room air Goal: *Hemodynamically stable without vasoactive medications Outcome: Progressing Towards Goal 
Patient on no blood pressure meds a this time

## 2018-11-25 NOTE — CONSULTS
The patient is a 53-year-old gentleman who has history of schizophrenia, currently resides at [facility], was admitted due to slurred speech and left-sided weakness off and on for approximately one week. In 08/2018 he had a CAT scan of the head done which showed a mass suggestive of high-grade glioma. When patient was brought in for further evaluation, a CT scan  showed significant interval increase in the size. He was admitted to the ICU and a psychiatry consult has been requested as he had requested to leave. The patient is a very poor historian. He reports he does have a mental illness and is followed at [facility]. He was  unable to give the name of his psychiatrist. He lives in [facility] and is diagnosed ID. During the assessment he presented guarded and agitated requesting to leave. He is aware he is in the hospital and is in here for medical reasons. He presents disheveled and remained tangential and disorganized with his thought process. He is not competent to make decisions at this time. Contacted [address] [place] [address]. I was directed to call the 's office. Talked to Ms. Maier. Relayed clinical presentation.  is agreeable to issue a medical TDO. However she requested to  to speak to the primary attending on the case. Updated Dr. Tamara Atkinson. Patient to be medically TDOed.

## 2018-11-25 NOTE — PROGRESS NOTES
Admission Medication Reconciliation: 
 
Information obtained from: [facility] paperwork only Significant PMH/Disease States:  
Past Medical History:  
Diagnosis Date  Hypercholesteremia  Hyperlipidemia  Hypertension  Impaction, bowel ([address]  MR (mental retardation)  Psychiatric disorder  Schizophrenia ([address] Chief Complaint for this Admission:  Extremity weakness Allergies:  Patient has no known allergies. Prior to Admission Medications:  
Prior to Admission Medications Prescriptions Last Dose Informant Patient Reported? Taking? Omeprazole delayed release (PRILOSEC D/R) 20 mg tablet   Yes Yes Sig: Take 20 mg by mouth daily. PSEUDOEPHEDRINE-dextromethorphan-guaiFENesin (ROBAFEN CF) 30- mg/5 mL solution   Yes Yes Sig: Take 5 mL by mouth every four (4) hours as needed for Cough. benztropine (COGENTIN) 1 mg tablet   Yes Yes Sig: Take 1 mg by mouth two (2) times a day. carbamide peroxide (DEBROX) 6.5 % otic solution   Yes Yes Sig: Administer 5 Drops into each ear two (2) times daily as needed for Other. cholecalciferol (VITAMIN D3) 1,000 unit tablet   Yes Yes Sig: Take 1,000 Units by mouth daily. divalproex DR (DEPAKOTE) 250 mg tablet   Yes Yes Sig: Take 500 mg by mouth nightly. divalproex ER (DEPAKOTE ER) 250 mg ER tablet   Yes Yes Sig: Take 250 mg by mouth daily. docusate sodium (COLACE) 100 mg capsule   Yes Yes Sig: Take 100 mg by mouth two (2) times a day.  
haloperidol (HALDOL) 5 mg tablet   Yes Yes Sig: Take 5 mg by mouth two (2) times a day. ibuprofen (MOTRIN) 400 mg tablet   Yes Yes Sig: Take  by mouth every six (6) hours as needed for Pain. lactulose (CHRONULAC) 10 gram/15 mL solution   Yes Yes Sig: Take  by mouth daily as needed for Other. lisinopril (PRINIVIL, ZESTRIL) 10 mg tablet   Yes Yes Sig: Take 10 mg by mouth daily. metFORMIN (GLUCOPHAGE) 1,000 mg tablet   Yes Yes Sig: Take 1,000 mg by mouth two (2) times daily (with meals). psyllium husk (METAMUCIL PO)   Yes Yes Sig: Take  by mouth two (2) times a day. senna (SENNA) 8.6 mg tablet   Yes Yes Sig: Take 1 Tab by mouth daily as needed for Constipation. simvastatin (ZOCOR) 10 mg tablet   Yes Yes Sig: Take 10 mg by mouth nightly. traZODone (DESYREL) 100 mg tablet   Yes Yes Sig: Take 100 mg by mouth nightly as needed. ziprasidone hcl (GEODON) 80 mg capsule   Yes Yes Sig: Take 80 mg by mouth two (2) times daily (with meals). Facility-Administered Medications: None Comments/Recommendations: Medication list compiled from facility paperwork. Unable to update administration times (patient does not know, no answer at facility, Dr Lesley barker). Thank you for allowing me to participate in the care of your patient. Dian MontanaD, RN #[id number]

## 2018-11-25 NOTE — PROGRESS NOTES
2200 TRANSFER - IN REPORT: 
 
Verbal report received from Trina Jeronimo on Philly Waldron  being received from Er  for routine progression of care Report consisted of patients Situation, Background, Assessment and  
Recommendations(SBAR). Information from the following report(s) SBAR, Kardex, ED Summary, Intake/Output, MAR, Accordion, Recent Results, Med Rec Status, Cardiac Rhythm normal sinus and Alarm Parameters  was reviewed with the receiving nurse. Opportunity for questions and clarification was provided. Assessment completed upon patients arrival to unit and care assumed. Primary Nurse Breanna Guzman RN and Vonnie Gomez RN performed a dual skin assessment on this patient No impairment noted Eliceo score is 22 Shift Summary: Patient had uneventful shift, no neuro deficits noted, patient denies any pain. He states \" I want to go home, I am fine. \" several times through the shift. Nurse spoke with a caregiver that called to check on him, stating that the [facility] was his POA and that the Director is Yolis Curtis and she can be reached [phone] or [phone]

## 2018-11-25 NOTE — PROGRESS NOTES
Hospitalist Progress Note Marilyn Soto MD 
Answering service: [phone] OR [phone] from in house phone Date of Service:  2018 NAME:  Yifan Cortez :  1965 MRN:  [id number] Admission Summary:  
53-year-old gentleman who has history of schizophrenia, currently resides at [facility], was sent as he has been seen by the staff with slurred speech and left-sided weakness off and on. 
2018 he had a CAT scan of the head done which showed a mass suggestive of high-grade glioma. Interval history / Subjective:  
 pt wants to leave, go back to group home,says that he had cocaine last wed Pt is refusing to take medications ,restless in bed moving his lower extremities constantly Assessment & Plan:  
 
Brain mass,suggestive of high-grade Glioma Repeat CT on admission showed significant interval increase in the size of large right temporoparietal heterogenous mass with solid and cystic component suspicious for high-grade lymphoma Neuro checks Neuro surgery consult Decadron,Keppra & mannitol MRI Hx of Schizophrenia On depakot Poor insight and judgement Pt has no family to contact,came from group home Has no assigned POA Refusing treatment, wants to leave against medical advice Consult psychiatry DM 
SSSI 
 
HLD Cont statin Code status:Full DVT prophylaxis: SCD Care Plan discussed with: pt, nurse Disposition: TBD Hospital Problems  Date Reviewed: 2016 Codes Class Noted POA Brain mass ICD-10-CM: G93.9 ICD-9-CM: 348.9  2018 Unknown Review of Systems: A comprehensive review of systems was negative except for that written in the HPI. Vital Signs:  
 Last 24hrs VS reviewed since prior progress note. Most recent are: 
Visit Vitals /85 Pulse (!) 53 Temp 98.2 °F (36.8 °C) Resp 19 Ht 5' 8\" (1.727 m) Wt 59 kg (130 lb 1.1 oz) SpO2 (!) 87% BMI 19.78 kg/m² No intake or output data in the 24 hours ending 11/25/18 0757 Physical Examination:  
 
 
     
Constitutional:  No acute distress, not cooperative ENT:  Oral mucous moist, oropharynx benign. Neck supple, Resp:  CTA bilaterally. No wheezing/rhonchi/rales. No accessory muscle use CV:  Regular rhythm, normal rate, no murmurs, gallops, rubs GI:  Soft, non distended, non tender. normoactive bowel sounds, no hepatosplenomegaly Musculoskeletal:  No edema, warm, 2+ pulses throughout Neurologic:  Moves all extremities. AAOx2, CN II-XII reviewed Psych:  poor insight and jugdement Data Review:  
 Review and/or order of clinical lab test 
 
 
Labs:  
 
Recent Labs  
  11/24/18 
1800 WBC 10.9 HGB 12.0*  
HCT 35.6*  Recent Labs  
  11/24/18 
1800   
K 3.6  CO2 28 BUN 15  
CREA 0.99 * CA 8.6 Recent Labs  
  11/24/18 
1800 SGOT 17 ALT 16 AP 64 TBILI 0.4 TP 7.3 ALB 3.6 GLOB 3.7 No results for input(s): INR, PTP, APTT in the last 72 hours. No lab exists for component: INREXT No results for input(s): FE, TIBC, PSAT, FERR in the last 72 hours. No results found for: FOL, RBCF No results for input(s): PH, PCO2, PO2 in the last 72 hours. Recent Labs  
  11/24/18 
1800 TROIQ <0.05 No results found for: CHOL, CHOLX, CHLST, CHOLV, HDL, LDL, LDLC, DLDLP, TGLX, TRIGL, TRIGP, CHHD, CHHDX Lab Results Component Value Date/Time Glucose (POC) 101 (H) 11/25/2018 07:02 AM  
 Glucose (POC) 134 (H) 01/08/2015 07:04 PM  
 
Lab Results Component Value Date/Time  Color YELLOW/STRAW 08/21/2018 07:30 PM  
 Appearance CLEAR 08/21/2018 07:30 PM  
 Specific gravity 1.015 08/21/2018 07:30 PM  
 pH (UA) 6.5 08/21/2018 07:30 PM  
 Protein NEGATIVE  08/21/2018 07:30 PM  
 Glucose NEGATIVE  08/21/2018 07:30 PM  
 Ketone NEGATIVE  08/21/2018 07:30 PM  
 Bilirubin NEGATIVE  08/21/2018 07:30 PM  
 Urobilinogen 0.2 08/21/2018 07:30 PM  
 Nitrites NEGATIVE  08/21/2018 07:30 PM  
 Leukocyte Esterase NEGATIVE  08/21/2018 07:30 PM  
 Epithelial cells FEW 08/21/2018 07:30 PM  
 Bacteria NEGATIVE  08/21/2018 07:30 PM  
 WBC 0-4 08/21/2018 07:30 PM  
 RBC 0-5 08/21/2018 07:30 PM  
 
 
 
Medications Reviewed:  
 
Current Facility-Administered Medications Medication Dose Route Frequency  sodium chloride (NS) flush 5-10 mL  5-10 mL IntraVENous Q8H  
 sodium chloride (NS) flush 5-10 mL  5-10 mL IntraVENous PRN  
 dexamethasone (PF) (DECADRON) injection 9 mg  9 mg IntraVENous Q6H  
 levETIRAcetam (KEPPRA) 1,000 mg in 0.9% sodium chloride 100 mL IVPB  1,000 mg IntraVENous Q12H  
 benztropine (COGENTIN) tablet 1 mg  1 mg Oral BID  divalproex DR (DEPAKOTE) tablet 500 mg  500 mg Oral QHS  divalproex ER (DEPAKOTE ER) 24 hour tablet 250 mg  250 mg Oral DAILY  docusate sodium (COLACE) capsule 100 mg  100 mg Oral BID  
 haloperidol (HALDOL) tablet 5 mg  5 mg Oral BID  lactulose (CHRONULAC) 10 gram/15 mL solution 10 g  15 mL Oral BID  insulin regular (NOVOLIN R, HUMULIN R) injection   SubCUTAneous AC&HS  
 glucose chewable tablet 16 g  4 Tab Oral PRN  
 dextrose (D50W) injection syrg 12.5-25 g  12.5-25 g IntraVENous PRN  
 glucagon (GLUCAGEN) injection 1 mg  1 mg IntraMUSCular PRN  pantoprazole (PROTONIX) tablet 40 mg  40 mg Oral ACB  
 
______________________________________________________________________ EXPECTED LENGTH OF STAY: - - - 
ACTUAL LENGTH OF STAY:          1 Cole Burgess MD

## 2018-11-25 NOTE — H&P
[address]  HISTORY AND PHYSICAL Nia Saucedo 
MR#: [id number] : 1965 ACCOUNT #: [de-identified] ADMIT DATE: 2018 PRIMARY CARE PHYSICIAN:  Not known. HISTORY OF PRESENT ILLNESS:  The patient is a 49-year-old gentleman who has history of schizophrenia, currently resides at [facility], was sent as he was seen by the staff with slurred speech and left-sided weakness off and on for approximately one week. It is not exactly clear when did they noticed these symptoms the first time. Looking at his record, it looks like in 2018 he had a CAT scan of the head done which showed a mass suggestive of high-grade glioma. When patient was brought in for further evaluation, a CT scan was ordered, which again showed significant interval increase in the size of large right temporoparietal heterogenous mass with solid and cystic component suspicious for high-grade Glioma. The patient is a very poor historian. He is not cooperative and is unable to give me much history except for weakness. In the emergency room, case was discussed with Neurosurgery and it was decided that the patient will get an MRI or CT with IV contrast, Decadron, Keppra and mannitol. PAST MEDICAL HISTORY:  Significant for schizophrenia. SOCIOECONOMIC HISTORY:  The patient does not smoke or drink. He lives at [facility]. CODE STATUS:  UNKNOWN. CURRENT MEDICATIONS:  Include Depakote 250 mg 2 tablets at bedtime and 1 tablet in the morning, docusate 100 mg twice daily, laxatives, Haldol 5 mg twice daily, lisinopril 10 mg daily, metformin 1 gram b.i.d., omeprazole 20 mg daily,  Simvastatin 10 mg daily, Geodon 80 mg twice daily, lactulose 15 mL every day, senna and trazodone 100 mg at bedtime. REVIEW OF SYSTEMS:  Cannot be done as the patient has schizophrenia. ALLERGIES:  NO KNOWN DRUG ALLERGIES.  
 
PHYSICAL EXAMINATION: 
 GENERAL:  The patient is a 78-year-old gentleman, not in any acute distress. VITAL SIGNS:  Temperature is 98.7, blood pressure 115/71, pulse is 66, respiratory rate is 15, saturation is 100%. HEENT:  Reveals pupils equally reacting to light and accommodation. NECK:  Supple. There is no lymphadenopathy or JVD. CHEST:  Clear. CARDIOVASCULAR:  S1 and S2 regular. No murmur, no S3. 
ABDOMEN:  Reveals no tenderness, no guarding, no rigidity. Bowel sounds are active. CENTRAL NERVOUS SYSTEM:  Reveals normal cranial nerves. At this time, there is no focal weakness. There is no pronator drift. Cerebellar examination is normal.  Gait was not checked. SKIN:  Warm and dry. PSYCHIATRIC:  Unable to do as the patient is not very cooperative, but is not very agitated at this time. LABORATORY DATA:  Lab work done in ER revealed a white count of 10.9, hemoglobin of 12, hematocrit 35.6, MCV of 88.3, platelet count is 425,798. Chemistries reveal sodium 138, potassium 3.6, chloride is 102, bicarb is 28, gap of 8, glucose 119, BUN of 15, creatinine 0.99. Calcium is 8.6, bilirubin 0.4, protein 7.3, albumin 3.6, globulin 3.7, ALT 16, AST 17, alkaline phosphatase 64. Troponin is less than 0.05. EKG shows sinus rhythm with first-degree block. CT head without contrast reveals significant interval increase in size of large right temporoparietal heterogenous mass with solid and cystic component with a few new small areas of acute hemorrhage and coarse calcification. There is significant interval increase in surrounding vasogenic edema with new 14 mm of leftward midline shift and descending transtentorial herniation, findings suggestive of high-grade glioma. ASSESSMENT AND PLAN:  
 
 The patient is a 78-year-old gentleman who has history of schizophrenia, currently lives in a group home, has no family members, was brought due to left-sided weakness. Will be admitted for: 1. Brain mass with vasogenic edema and midline shift with transtentorial herniation with hemorrhage. We will order CT with Contrast (Unable to get check list done for MRI due to patients Schizophrenia) for further evaluation of this mass, probably glioma. 2.  Consult Neurosurgery. 3.  We will start on Decadron, Keppra and mannitol. 4.  Patient will be admitted in intensive care unit. 5.  History of schizophrenia, currently on Depakote, which will be continued. 6.  History of diabetes. We will hold metformin due to recent imaging study. 7.  History of hyperlipidemia. We will continue statin. 8.  I tried to call a number, Charles Almaraz, [phone], and was told that that person has , so no contacts are available at this time. 9.  Deep venous thrombosis prophylaxis with sequential compression devices. Darylene Postal, MD SF[facility]/ 
D: 2018 19:49    
T: 2018 23:22 
JOB #: [id number]

## 2018-11-26 ENCOUNTER — APPOINTMENT (OUTPATIENT)
Dept: MRI IMAGING | Age: 53
DRG: 054 | End: 2018-11-26
Attending: HOSPITALIST
Payer: MEDICARE

## 2018-11-26 LAB
GLUCOSE BLD STRIP.AUTO-MCNC: 138 MG/DL (ref 65–100)
GLUCOSE BLD STRIP.AUTO-MCNC: 156 MG/DL (ref 65–100)
GLUCOSE BLD STRIP.AUTO-MCNC: 158 MG/DL (ref 65–100)
GLUCOSE BLD STRIP.AUTO-MCNC: 182 MG/DL (ref 65–100)
SERVICE CMNT-IMP: ABNORMAL

## 2018-11-26 PROCEDURE — 74011636637 HC RX REV CODE- 636/637: Performed by: HOSPITALIST

## 2018-11-26 PROCEDURE — 65610000006 HC RM INTENSIVE CARE

## 2018-11-26 PROCEDURE — 94760 N-INVAS EAR/PLS OXIMETRY 1: CPT

## 2018-11-26 PROCEDURE — 74011250637 HC RX REV CODE- 250/637: Performed by: NURSE PRACTITIONER

## 2018-11-26 PROCEDURE — 74011250636 HC RX REV CODE- 250/636: Performed by: SPECIALIST

## 2018-11-26 PROCEDURE — 82962 GLUCOSE BLOOD TEST: CPT

## 2018-11-26 PROCEDURE — 74011250637 HC RX REV CODE- 250/637: Performed by: HOSPITALIST

## 2018-11-26 PROCEDURE — 74011250637 HC RX REV CODE- 250/637: Performed by: SPECIALIST

## 2018-11-26 RX ORDER — LISINOPRIL 10 MG/1
10 TABLET ORAL DAILY
Status: DISCONTINUED | OUTPATIENT
Start: 2018-11-26 | End: 2018-12-13 | Stop reason: HOSPADM

## 2018-11-26 RX ORDER — CLONIDINE HYDROCHLORIDE 0.1 MG/1
0.1 TABLET ORAL
Status: DISCONTINUED | OUTPATIENT
Start: 2018-11-26 | End: 2018-12-13 | Stop reason: HOSPADM

## 2018-11-26 RX ADMIN — HUMAN INSULIN 2 UNITS: 100 INJECTION, SOLUTION SUBCUTANEOUS at 17:00

## 2018-11-26 RX ADMIN — HALOPERIDOL 5 MG: 5 TABLET ORAL at 09:06

## 2018-11-26 RX ADMIN — PANTOPRAZOLE SODIUM 40 MG: 40 TABLET, DELAYED RELEASE ORAL at 09:05

## 2018-11-26 RX ADMIN — DEXAMETHASONE 6 MG: 4 TABLET ORAL at 17:00

## 2018-11-26 RX ADMIN — HALOPERIDOL 5 MG: 5 TABLET ORAL at 17:00

## 2018-11-26 RX ADMIN — DEXAMETHASONE 6 MG: 4 TABLET ORAL at 23:04

## 2018-11-26 RX ADMIN — LISINOPRIL 10 MG: 10 TABLET ORAL at 13:10

## 2018-11-26 RX ADMIN — DIVALPROEX SODIUM 250 MG: 250 TABLET, EXTENDED RELEASE ORAL at 09:06

## 2018-11-26 RX ADMIN — DOCUSATE SODIUM 100 MG: 100 CAPSULE, LIQUID FILLED ORAL at 17:00

## 2018-11-26 RX ADMIN — BENZTROPINE MESYLATE 1 MG: 1 TABLET ORAL at 09:05

## 2018-11-26 RX ADMIN — DIVALPROEX SODIUM 500 MG: 500 TABLET, DELAYED RELEASE ORAL at 23:04

## 2018-11-26 RX ADMIN — BENZTROPINE MESYLATE 1 MG: 1 TABLET ORAL at 17:00

## 2018-11-26 RX ADMIN — LEVETIRACETAM 1000 MG: 500 TABLET ORAL at 09:04

## 2018-11-26 RX ADMIN — DOCUSATE SODIUM 100 MG: 100 CAPSULE, LIQUID FILLED ORAL at 09:04

## 2018-11-26 RX ADMIN — DEXAMETHASONE 6 MG: 4 TABLET ORAL at 13:10

## 2018-11-26 RX ADMIN — LEVETIRACETAM 1000 MG: 500 TABLET ORAL at 17:00

## 2018-11-26 NOTE — PROGRESS NOTES
Reason for Admission:   Came to ER due to slurred speech, left facial droop and weakness. CT showed large right brain tumor. Patient has been trying to leave the hospital and was Medically TDO's last night  (not able to make his own decisions, per psychiatry)   Patient has medical hx of schizophrenia/intellectual disability. RRAT Score:    9 Plan for utilizing home health:    Not determined     May be a candidate for hospice. Likelihood of Readmission:  Low Transition of Care Plan:       TBD-- patient has no next of kin nor guardian-- he is followed by [facility] for psychiatric care-- Edilberto Lopez [phone] is his . He resides at [facility] adult [phone] [employer] [place] is the director) CM talked with Lori Onofre, one of the nurses at [facility] and she confirmed that patient was able to eat and ambulate on his own  prior to admission. His medications are managed by the staff at the adult home and he was receiving assistance with dressing and showering. The staff assisted n picking out daily clothing for him to wear. His laundry is done by the staff at the adult home. Lori Onofre also confirmed no living family known to patient. CM talked with , Edilberto Lopez at [facility]. She confirmed that patient has no living relatives. He has been on the list for public guardian for 18 months. She spoke with her supervisor and  is not able to expedite this due the fact he is in the hospital.   
 
CM attempted to talk with patient but he was sleeping and CM did not want to wake him and agitate him. -- he has poor insight into his condition. If he attempts to leave, he may need to be be TDO again. Transition of care plan TBD. Care Management Interventions PCP Verified by CM: Annabelle Merlos, psychiatrist) Mode of Transport at Discharge: (TBD) Transition of Care Consult (CM Consult): Discharge Planning Discharge Durable Medical Equipment: No 
Physical Therapy Consult: No 
Occupational Therapy Consult: No 
Speech Therapy Consult: No 
Current Support Network: Adult Group Home(resides at [address] [facility] assistance with dressing and bathing. ambulates and eats independently. No AMD  No next of kin--Guardianship pending) Confirm Follow Up Transport: Other (see comment)(medicaid transport) Plan discussed with Pt/Family/Caregiver: Yes Discharge Location Discharge Placement: (TBD)

## 2018-11-26 NOTE — PROGRESS NOTES
Contacted [facility] to obtain  information. I was given the name of Merry Mejia at [phone]. I called and left a voicemail and asked her to return my phone call. I also called  Amber Almazan at [phone]. My contact info was given to the director who will give me a call. Eber Bellamy NP.  
 
11/26/18 1002 Spoke with Merry Mejia at [facility]. The patient has no family and apparently has been on a waiting list for a public guardian for 18 months! She stated she would talk to her supervisor to see if there was anything else to do in terms of obtaining a guardian faster. Relayed info to . The patient's current psychiatrist is Dr. Edmundo Camargo at [phone].   
 
Eber Bellamy NP

## 2018-11-26 NOTE — CONSULTS
Palliative Medicine    Consult from Ns[facility] team received. Discussed w/ care management, Burgess Huber, and ICU team . Pt w/ hx of schizophrenia and CT head w/ large non-operable brain mass. Pt under medical TDO, Psychiatry saw 11/25 and pt does not have capacity to make medical decisions. Apparently w/ [facility] pt has no family and is on the guardianship list for 18 months. Care management aware and working on this. Unfortunately our team's ability to help w/ care decisions is limited until there is a guardian in place. Until then, proceed w/ medical interventions that are indicated. Please reconsult us when guardian in place.      Contacted Nsgy team.

## 2018-11-26 NOTE — PROGRESS NOTES
Neurosurgery Progress Note Betty Nascimento 
[phone] Admit Date: 2018 LOS: 2 days Daily Progress Note: 2018 Subjective: The patient is intellectually disabled gentleman with schizophrenia who resides at [facility] and is followed by [facility] for his psychiatric care. He developed 1 week of slurred speech, left facial droop, LUE/LLE weakness prior to arrival. EMS was called. The patient was apparently confused upon arrival. His head CT revealed a large right hemispheric primary brain tumor. The patient has been trying to leave the hospital. He forgets that we told him he had a brain tumor. He says he doesn't have anything in his head. A medical TDO was obtained last night. We are currently working on trying to contact the patient's  at [facility] and the director at [facility] to find out who makes medical decisions for the patient. Objective:  
 
Vital signs Temp (24hrs), Av.9 °F (36.6 °C), Min:96.9 °F (36.1 °C), Max:98.6 °F (37 °C) 
 701 - 1900 In: 400 [P.O.:400] Out: -   1901 -  0700 In: 480 [P.O.:480] Out: 700 [Urine:700] Visit Vitals BP (!) 129/96 (BP 1 Location: Left arm, BP Patient Position: At rest) Pulse 67 Temp 96.9 °F (36.1 °C) Resp 18 Ht 5' 8\" (1.727 m) Wt 54.1 kg (119 lb 4.3 oz) SpO2 95% BMI 18.13 kg/m² O2 Device: Room air Pain control Pain Assessment Pain Scale 1: Numeric (0 - 10) Pain Intensity 1: 0 
 
PT/OT Gait Physical Exam: 
Gen:NAD. Neuro: A&Ox2. Follows commands. Speech clear. Affect agitated. Poor insight into medical situation. PERRL. EOMI. Left central facial droop. Tongue midline. EARL spontaneously. RUE/RLE 5/5. LUE/LLE 4/5 Positive left pronator drift Gait deferred.  
 
CT head without contrast on 18 shows significant interval increase in size of large right temporoparietal heterogeneous mass with solid and cystic components, with few new small areas of acute hemorrhage, and redemonstrated areas of coarse calcification. Significant 
interval increase in surrounding vasogenic edema, with new 14 mm of leftward midline shift and descending transtentorial herniation. Findings are again suspicious for high-grade glioma. CT head with contrast on 11/24/18 shows right parietotemporal partially cystic, partially solid enhancing intra-axial mass which has increased significantly in size compared to prior CT 
dated August 2018. Acute hemorrhage is present within this mass. 1.3 cm of right left subfalcine herniation. 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 MR with contrast can be performed for further evaluation, as indicated. 24 hour results: 
 
Recent Results (from the past 24 hour(s)) GLUCOSE, POC Collection Time: 11/25/18 12:14 PM  
Result Value Ref Range Glucose (POC) 114 (H) 65 - 100 mg/dL Performed by Kathleen Dunaway (RN)   
GLUCOSE, POC Collection Time: 11/25/18 10:26 PM  
Result Value Ref Range Glucose (POC) 136 (H) 65 - 100 mg/dL Performed by Marah Schultz GLUCOSE, POC Collection Time: 11/26/18  8:47 AM  
Result Value Ref Range Glucose (POC) 182 (H) 65 - 100 mg/dL Performed by Nadine Ruiz Assessment:  
 
Active Problems: 
  Brain mass (11/24/2018) Plan: 1. Right parietotemporal brain mass 
 - Not a surgical candidate due to the size and invasiveness of tumor. Prognosis for survival with surgery or without surgery is extremely poor either way. Won't hold still for MRI. - Cont Decadron 
 - Cont keppra and depakote - Palliative care consult to help with care decisions. Likely patient will need Hospice 2. Brain compression with cerebral edema 
 - due to #1 
 - plans as above 3. HTN 
 - Cont lisinopril from home 
 - SBP<160 
 - Clonidine PRN 4. Schizophrenia/Intellectual disability 
 - cont Cogentin, Haldol, depakote from home - Hospitalist following - Pt followed by Dr. Jone Goddard and [facility] - Medical TDO from last night. Unable to make his own decisions. Has been on guardianship waiting list for 18 months Plan d/w Dr. Angela Noriega, Dr. Kenyon Shaw, ICU nurse,  from [facility],  from [facility]. Please see separate note for information from those discussions.   
 
 
Nichole Barnes NP

## 2018-11-26 NOTE — PROGRESS NOTES
Hospitalist Progress Note Priya Mensah MD 
Answering service: [phone] OR [phone] from in house phone Date of Service:  2018 NAME:  Lara Manzo :  1965 MRN:  [id number] Admission Summary:  
40-year-old gentleman who has history of schizophrenia, currently resides at [facility], was sent as he has been seen by the staff with slurred speech and left-sided weakness off and on. 
2018 he had a CAT scan of the head done which showed a mass suggestive of high-grade glioma. Interval history / Subjective:  
 pt wants to leave, go back to group home,says he does not believe that he is sick has nothing Wrong with his brain Pt is refusing to take medications Assessment & Plan:  
 
Brain mass,suggestive of high-grade Glioma Repeat CT on admission showed significant interval increase in the size of large right temporoparietal heterogenous mass with solid and cystic component suspicious for high-grade lymphoma Neuro checks Neuro surgery consulted, its large cystic mass, not operable per NS,pt is not a surgical candidate Decadron,Keppra & mannitol Hx of Schizophrenia Haldol  
depakot Psych consulted Poor insight and judgement Pt has no family to contact,came from group home Has no assigned POA Refusing treatment, wants to leave against medical advice Consulted psychiatry TDO obtained  Pt is not surgical candidate, needs palliative care Pt has no Guardian, at group home looking for [employer] 
SSSI 
 
HLD Cont statin Code status:Full DVT prophylaxis: SCD Care Plan discussed with: pt, nurse Disposition: TBD- needs Guardianship , psych consulted Consult  Hospital Problems  Date Reviewed: 2016 Codes Class Noted POA Brain mass ICD-10-CM: G93.9 ICD-9-CM: 348.9  2018 Unknown Review of Systems: A comprehensive review of systems was negative except for that written in the HPI. Vital Signs:  
 Last 24hrs VS reviewed since prior progress note. Most recent are: 
Visit Vitals /83 Pulse 68 Temp 97.7 °F (36.5 °C) Resp 18 Ht 5' 8\" (1.727 m) Wt 54.1 kg (119 lb 4.3 oz) SpO2 100% BMI 18.13 kg/m² Intake/Output Summary (Last 24 hours) at 11/26/2018 1608 Last data filed at 11/26/2018 1200 Gross per 24 hour Intake 640 ml Output 700 ml Net -60 ml Physical Examination:  
 
 
     
Constitutional:  No acute distress, not cooperative ENT:  Oral mucous moist, oropharynx benign. Neck supple, Resp:  CTA bilaterally. No wheezing/rhonchi/rales. No accessory muscle use CV:  Regular rhythm, normal rate, no murmurs, gallops, rubs GI:  Soft, non distended, non tender. normoactive bowel sounds, no hepatosplenomegaly Musculoskeletal:  No edema, warm, 2+ pulses throughout Neurologic:  Moves all extremities. AAOx2, CN II-XII reviewed Psych:  poor insight and jugdement Data Review:  
 Review and/or order of clinical lab test 
 
 
Labs:  
 
Recent Labs  
  11/24/18 
1800 WBC 10.9 HGB 12.0*  
HCT 35.6*  Recent Labs  
  11/24/18 
1800   
K 3.6  CO2 28 BUN 15  
CREA 0.99 * CA 8.6 Recent Labs  
  11/24/18 
1800 SGOT 17 ALT 16 AP 64 TBILI 0.4 TP 7.3 ALB 3.6 GLOB 3.7 No results for input(s): INR, PTP, APTT in the last 72 hours. No lab exists for component: INREXT, INREXT No results for input(s): FE, TIBC, PSAT, FERR in the last 72 hours. No results found for: FOL, RBCF No results for input(s): PH, PCO2, PO2 in the last 72 hours. Recent Labs  
  11/24/18 
1800 TROIQ <0.05 No results found for: CHOL, CHOLX, CHLST, CHOLV, HDL, LDL, LDLC, DLDLP, TGLX, TRIGL, TRIGP, CHHD, CHHDX Lab Results Component Value Date/Time  Glucose (POC) 138 (H) 11/26/2018 01:04 PM  
 Glucose (POC) 182 (H) 11/26/2018 08:47 AM  
 Glucose (POC) 136 (H) 11/25/2018 10:26 PM  
 Glucose (POC) 114 (H) 11/25/2018 12:14 PM  
 Glucose (POC) 101 (H) 11/25/2018 07:02 AM  
 
Lab Results Component Value Date/Time Color YELLOW/STRAW 08/21/2018 07:30 PM  
 Appearance CLEAR 08/21/2018 07:30 PM  
 Specific gravity 1.015 08/21/2018 07:30 PM  
 pH (UA) 6.5 08/21/2018 07:30 PM  
 Protein NEGATIVE  08/21/2018 07:30 PM  
 Glucose NEGATIVE  08/21/2018 07:30 PM  
 Ketone NEGATIVE  08/21/2018 07:30 PM  
 Bilirubin NEGATIVE  08/21/2018 07:30 PM  
 Urobilinogen 0.2 08/21/2018 07:30 PM  
 Nitrites NEGATIVE  08/21/2018 07:30 PM  
 Leukocyte Esterase NEGATIVE  08/21/2018 07:30 PM  
 Epithelial cells FEW 08/21/2018 07:30 PM  
 Bacteria NEGATIVE  08/21/2018 07:30 PM  
 WBC 0-4 08/21/2018 07:30 PM  
 RBC 0-5 08/21/2018 07:30 PM  
 
 
 
Medications Reviewed:  
 
Current Facility-Administered Medications Medication Dose Route Frequency  lisinopril (PRINIVIL, ZESTRIL) tablet 10 mg  10 mg Oral DAILY  cloNIDine HCl (CATAPRES) tablet 0.1 mg  0.1 mg Oral Q4H PRN  
 sodium chloride (NS) flush 5-10 mL  5-10 mL IntraVENous Q8H  
 sodium chloride (NS) flush 5-10 mL  5-10 mL IntraVENous PRN  
 benztropine (COGENTIN) tablet 1 mg  1 mg Oral BID  divalproex DR (DEPAKOTE) tablet 500 mg  500 mg Oral QHS  divalproex ER (DEPAKOTE ER) 24 hour tablet 250 mg  250 mg Oral DAILY  docusate sodium (COLACE) capsule 100 mg  100 mg Oral BID  
 haloperidol (HALDOL) tablet 5 mg  5 mg Oral BID  lactulose (CHRONULAC) 10 gram/15 mL solution 10 g  15 mL Oral BID  insulin regular (NOVOLIN R, HUMULIN R) injection   SubCUTAneous AC&HS  
 glucose chewable tablet 16 g  4 Tab Oral PRN  
 dextrose (D50W) injection syrg 12.5-25 g  12.5-25 g IntraVENous PRN  
 glucagon (GLUCAGEN) injection 1 mg  1 mg IntraMUSCular PRN  pantoprazole (PROTONIX) tablet 40 mg  40 mg Oral ACB  levETIRAcetam (KEPPRA) tablet 1,000 mg  1,000 mg Oral BID  
  dexamethasone (DECADRON) tablet 6 mg  6 mg Oral Q6H  
 
______________________________________________________________________ EXPECTED LENGTH OF STAY: 4d 12h ACTUAL LENGTH OF STAY:          2 Abrahan Mccoy MD

## 2018-11-26 NOTE — PROGRESS NOTES
Problem: Falls - Risk of 
Goal: *Absence of Falls Document April Alice Fall Risk and appropriate interventions in the flowsheet. Outcome: Progressing Towards Goal 
Fall Risk Interventions: 
Mobility Interventions: Communicate number of staff needed for ambulation/transfer, Bed/chair exit alarm, Strengthening exercises (ROM-active/passive) Mentation Interventions: Adequate sleep, hydration, pain control, Door open when patient unattended, Increase mobility, Reorient patient, Room close to nurse's station Elimination Interventions: Call light in reach, Toileting schedule/hourly rounds, Patient to call for help with toileting needs History of Falls Interventions: Door open when patient unattended, Room close to nurse's station

## 2018-11-26 NOTE — PROGRESS NOTES
PCCM 
 
Chart reviewed/ pt examined Discussed on multi-D rounds He wants to go home Poor insight No POA Comfortable Patient Vitals for the past 12 hrs: 
 Temp Pulse Resp BP SpO2  
11/26/18 1200 97.7 °F (36.5 °C) 61 18 125/83 100 % 11/26/18 0800 96.9 °F (36.1 °C) 67 18 (!) 129/96 95 % 11/26/18 0400 98.3 °F (36.8 °C) 65 20 (!) 147/95 100 % NC/AT On room air No WOB Chest clear CV s1s2 
abd soft LE neg Impressions Brain mass= prob ca with edema 
htn 
schizophrenia Intellectual disability REC 
ICU care as able w limited complance BP control AED/Decadron per NSGY Will see as needed acute ICU issues

## 2018-11-26 NOTE — PROGRESS NOTES
HD 2 
Wants to go home, refuses to believe he has a brain tumor Afeb, VSS Alert, oriented to place Left facial weakness Left pronator drift, otherwise good strength A/P: CT shows aggressive  large cystic mass - consistent with high grade glioma - rapid growth since CT in August. 
No guardian, no next of kin Not a surgical candidate Reviewed CT and situation with Dr. Ade Jonas

## 2018-11-26 NOTE — PROGRESS NOTES
1930- Bedside and Verbal shift change report given to Bogdan BUTLER (oncoming nurse) by [facility] (offgoing nurse). Report included the following information SBAR, Kardex, ED Summary, Intake/Output, MAR, Recent Results, Med Rec Status and Cardiac Rhythm NSR. 
 
2000- Patient refusing VS. 
2200- Paged Dr. Villalba Leader, to ask if able to switch IV Decadron Q6 and IV Keppra Q12 to oral equivalent. Received orders for oral equivalent. 2220- After much coaxing, patient finally agreed to take oral pills and gave permission for a finger stick for a glucose check. 0000- Attempting to complete MRI screening form by looking at previous admission data and called RN from [facility] to discuss previous history, since patient is currently not competent and does not believe he has a brain mass. RN from [facility] stated that from looking at his chart patient had not had previous MRI done and has no significant surgical history. 0400- Patient allowing a VS check. 0630- Patient refusing oral medication. States that he will allow a finger stick for glucose but not at this moment but maybe before breakfast.  
0700- Paged MRI to ask who will be signing for the patient in order to complete MRI screen sheet. MRI states that they will have to check with Radiologist and see if they need to order CT/XR to determine if patient has any metallic bodies. 0730- Bedside and Verbal shift change report given to [address] Loco (oncoming nurse) by Michael Donis RN (offgoing nurse). Report included the following information SBAR, Kardex, ED Summary, Intake/Output, MAR, Recent Results, Med Rec Status and Cardiac Rhythm NSR.

## 2018-11-26 NOTE — PROGRESS NOTES
0800 Bedside and Verbal shift change report given to Shirley Bradley RN (oncoming nurse) by Li Gomez RN (offgoing nurse). Report included the following information SBAR, Kardex, ED Summary, Intake/Output, MAR, Recent Results and Cardiac Rhythm SR. Assumed care of pt. Assessment completed. 0900 Pt agreeing to PO medications, except for lactulose, pt agreed to POC glucose but refused 2 units of insulin regular. 1200 Pt refusing POC glucose check before lunch. 1500 MRI called, asking if pt ready for MRI with contrast 
1520 This RN placing IV for contrast 
1530 This RN taking pt down to MRI with Lucy, PCT 
1400 This RN bring pt back up from MRI with Lucy, PCT. Unable to obtain MRI, pt with continuous movement. 1415 Pt agreeing to wear leads for monitor. 1815 Pt pulled out IV in left AC and took off leads for monitor. 2000 Bedside and Verbal shift change report given to LUKE Mcfadden (oncoming nurse) by Shirley Bradley RN (offgoing nurse). Report included the following information SBAR, Kardex, ED Summary, Intake/Output, MAR, Recent Results and Cardiac Rhythm NSR .

## 2018-11-27 LAB
GLUCOSE BLD STRIP.AUTO-MCNC: 120 MG/DL (ref 65–100)
GLUCOSE BLD STRIP.AUTO-MCNC: 140 MG/DL (ref 65–100)
GLUCOSE BLD STRIP.AUTO-MCNC: 152 MG/DL (ref 65–100)
GLUCOSE BLD STRIP.AUTO-MCNC: 194 MG/DL (ref 65–100)
SERVICE CMNT-IMP: ABNORMAL

## 2018-11-27 PROCEDURE — 74011250637 HC RX REV CODE- 250/637: Performed by: SPECIALIST

## 2018-11-27 PROCEDURE — 65610000006 HC RM INTENSIVE CARE

## 2018-11-27 PROCEDURE — 82962 GLUCOSE BLOOD TEST: CPT

## 2018-11-27 PROCEDURE — 74011250637 HC RX REV CODE- 250/637: Performed by: NURSE PRACTITIONER

## 2018-11-27 PROCEDURE — 74011636637 HC RX REV CODE- 636/637: Performed by: HOSPITALIST

## 2018-11-27 PROCEDURE — 74011250637 HC RX REV CODE- 250/637: Performed by: HOSPITALIST

## 2018-11-27 PROCEDURE — 74011250636 HC RX REV CODE- 250/636: Performed by: SPECIALIST

## 2018-11-27 RX ADMIN — LISINOPRIL 10 MG: 10 TABLET ORAL at 08:50

## 2018-11-27 RX ADMIN — LEVETIRACETAM 1000 MG: 500 TABLET ORAL at 18:18

## 2018-11-27 RX ADMIN — DEXAMETHASONE 6 MG: 4 TABLET ORAL at 18:18

## 2018-11-27 RX ADMIN — DEXAMETHASONE 6 MG: 4 TABLET ORAL at 12:20

## 2018-11-27 RX ADMIN — BENZTROPINE MESYLATE 1 MG: 1 TABLET ORAL at 08:50

## 2018-11-27 RX ADMIN — HUMAN INSULIN 2 UNITS: 100 INJECTION, SOLUTION SUBCUTANEOUS at 06:52

## 2018-11-27 RX ADMIN — DEXAMETHASONE 6 MG: 4 TABLET ORAL at 06:49

## 2018-11-27 RX ADMIN — PANTOPRAZOLE SODIUM 40 MG: 40 TABLET, DELAYED RELEASE ORAL at 06:49

## 2018-11-27 RX ADMIN — HALOPERIDOL 5 MG: 5 TABLET ORAL at 18:19

## 2018-11-27 RX ADMIN — BENZTROPINE MESYLATE 1 MG: 1 TABLET ORAL at 18:18

## 2018-11-27 RX ADMIN — HUMAN INSULIN 2 UNITS: 100 INJECTION, SOLUTION SUBCUTANEOUS at 16:30

## 2018-11-27 RX ADMIN — LEVETIRACETAM 1000 MG: 500 TABLET ORAL at 08:50

## 2018-11-27 RX ADMIN — DOCUSATE SODIUM 100 MG: 100 CAPSULE, LIQUID FILLED ORAL at 08:50

## 2018-11-27 RX ADMIN — HALOPERIDOL 5 MG: 5 TABLET ORAL at 08:50

## 2018-11-27 RX ADMIN — DIVALPROEX SODIUM 500 MG: 500 TABLET, DELAYED RELEASE ORAL at 22:33

## 2018-11-27 RX ADMIN — DIVALPROEX SODIUM 250 MG: 250 TABLET, EXTENDED RELEASE ORAL at 08:50

## 2018-11-27 RX ADMIN — DOCUSATE SODIUM 100 MG: 100 CAPSULE, LIQUID FILLED ORAL at 18:18

## 2018-11-27 NOTE — PROGRESS NOTES
Spiritual Care Assessment/Progress Note [address] 
 
 
NAME: Yesica Orona      MRN: [id number] AGE: 52 y.o. SEX: male Mormon Affiliation: No Religious Language: English  
 
11/27/2018     Total Time (in minutes): 5 Spiritual Assessment begun in [address] [place] [address] INTENSIVE CARE through conversation with: 
  
    []Patient        [] Family    [] Friend(s) Reason for Consult: Palliative Care, Initial/Spiritual Assessment Spiritual beliefs: (Please include comment if needed) 
   [] Identifies with a jesica tradition:     
   [] Supported by a jesica community:        
   [] Claims no spiritual orientation:       
   [] Seeking spiritual identity:            
   [] Adheres to an individual form of spirituality:       
   [x] Not able to assess: pt resting Identified resources for coping:  
   [] Prayer                           
   [] Music                  [] Guided Imagery 
   [] Family/friends                 [] Pet visits [] Devotional reading                         [x] Unknown 
   [] Other Interventions offered during this visit: (See comments for more details) Patient Interventions: Initial visit Plan of Care: 
 
 [] Support spiritual and/or cultural needs  
 [] Support AMD and/or advance care planning process    
 [] Support grieving process 
 [] Coordinate Rites and/or Rituals  
 [] Coordination with community clergy [] No spiritual needs identified at this time 
 [] Detailed Plan of Care below (See Comments)  [] Make referral to Music Therapy 
[] Make referral to Pet Therapy    
[] Make referral to Addiction services 
[] Make referral to [facility] 
[] Make referral to Spiritual Care Partner 
[] No future visits requested       
[x] Follow up visits as needed Comments: Pt appeared to be resting with sitter at bedside; did not attempt to visit at this time. Chart reviewed prior to visit. Chaplains are available for support as needed; will plan to follow-up again as able. Roxana Cuevas, Palliative

## 2018-11-27 NOTE — PROGRESS NOTES
Problem: Falls - Risk of 
Goal: *Absence of Falls Document Elaine Marc Fall Risk and appropriate interventions in the flowsheet. Outcome: Progressing Towards Goal 
Fall Risk Interventions: 
Mobility Interventions: Communicate number of staff needed for ambulation/transfer, Patient to call before getting OOB, Strengthening exercises (ROM-active/passive) Mentation Interventions: Adequate sleep, hydration, pain control, More frequent rounding, Family/sitter at bedside, Room close to nurse's station, Reorient patient Medication Interventions: Evaluate medications/consider consulting pharmacy, Teach patient to arise slowly Elimination Interventions: Call light in reach, Patient to call for help with toileting needs, Toilet paper/wipes in reach, Toileting schedule/hourly rounds, Urinal in reach History of Falls Interventions: Bed/chair exit alarm, Door open when patient unattended, Room close to nurse's station

## 2018-11-27 NOTE — PROGRESS NOTES
Care manager left a voice mail message for Paul So [phone] , patient's care manager with [facility] to confirm that patient does not have any living relatives as a woman Nabor Saravia has called stating she is the patient's mother. Care management will continue to follow. Lakisha Mantilla RN,CRM 
 
2:30 pm Received call back from Paul So with [facility] and she confirmed patient's mother Nabor Saravia is in fact his mother. Per Luma she is mentally challenged. Received call from ANJEL with [facility] who also confirmed Nabor Saravia is patient's mother and he also has a sister in [place], however in the 27 years Sam Garrett has lived at [facility] she has never met her. Per ANJEL patient has a cousin Willie Newsome [phone] who lives locally. If patient is to need Hospice care he will most likely need placement as [facility] is not licensed for Hospice care. Lakisha Mantilla RN,CRM 
 
3:00 pm Care manger spoke with patient's cousin Willie Newsome and per Toni Guzman patient's mother Nabor Saravia is currently residing with her. Per Toni Guzman patient's sister Papito Deal  and their mother have had a \"falling out\" and Ishan Baltazar has refused to give out her contact information. Per Toni Guzman patient's other aunt who has been the next of kin for patient Leyla Singh passed away earlier this year. Graciela plans on bringing her aunt to see patient. Lakisha Mantilla RN,CRM

## 2018-11-27 NOTE — PROGRESS NOTES
2130- Someone named Ariane Alex called the unit claiming to be patient's mother and wanting to know information about patient. She was apparently redirected to call [place] by [facility]. Patient was admitted from [facility] with the information that patient had no next of kin. Gave social workers number and told Ariane Alex to call [facility] again to straighten things out since this RN is not able to give information out over the phone to an unverified person. 0730-Bedside and Verbal shift change report given to [address] (oncoming nurse) by Angie Ledesma RN (offgoing nurse). Report included the following information SBAR, Kardex, Intake/Output, MAR and Med Rec Status. Shift Summary: Patient Alert, Oriented to person and place. FC. VSS allows manual vs check every 4 hours. Has taken all scheduled medicine during shift. Patient seems more compliant this shift.

## 2018-11-27 NOTE — PROGRESS NOTES
Hospitalist Progress Note Polly Youssef MD 
Answering service: [phone] OR [phone] from in house phone Date of Service:  2018 NAME:  Cecil Orellana :  1965 MRN:  [id number] Admission Summary:  
61-year-old gentleman who has history of schizophrenia, currently resides at [facility], was sent as he has been seen by the staff with slurred speech and left-sided weakness off and on. 
2018 he had a CAT scan of the head done which showed a mass suggestive of high-grade glioma. Interval history / Subjective:  
 pt has been non compliant,pt wants to leave, go back to group home ,does not understand severity of disease Assessment & Plan:  
 
Brain mass,suggestive of high-grade Glioma Repeat CT on admission showed significant interval increase in the size of large right temporoparietal heterogenous mass with solid and cystic component suspicious for high-grade lymphoma Neuro checks ,stable no new change Neuro surgery consulted, its large cystic mass, not operable per NS,pt is not a surgical candidate Decadron,Keppra & mannitol Hx of Schizophrenia Haldol  
depakot Psych consulted Poor insight and judgement Pt has no family to contact,came from group home Has no assigned POA currently Refusing treatment, wants to leave against medical advice Consulted psychiatry TDO obtained  Pt is not surgical candidate, 
 palliative care consult Pt has no Guardian, looking for [employer] DM 
SSSI 
 
HLD Cont statin Code status:Full DVT prophylaxis: SCD Disposition: TBD- needs Guardianship , psych has been consulted Per , located pt's family -pt mother is mentally challenged, pt has a cousing who will help her aunt to see the patient Hospital Problems  Date Reviewed: 2016 Codes Class Noted POA Brain mass ICD-10-CM: G93.9 ICD-9-CM: 348.9  11/24/2018 Unknown Review of Systems: A comprehensive review of systems was negative except for that written in the HPI. Vital Signs:  
 Last 24hrs VS reviewed since prior progress note. Most recent are: 
Visit Vitals /81 (BP 1 Location: Right arm, BP Patient Position: At rest) Pulse 62 Temp 98.6 °F (37 °C) Resp 20 Ht 5' 8\" (1.727 m) Wt 52.5 kg (115 lb 11.9 oz) SpO2 100% BMI 17.60 kg/m² Intake/Output Summary (Last 24 hours) at 11/27/2018 1744 Last data filed at 11/27/2018 1546 Gross per 24 hour Intake 360 ml Output 2225 ml Net -1865 ml Physical Examination:  
 
 
     
Constitutional:  No acute distress, not cooperative ENT:  Oral mucous moist, oropharynx benign. Neck supple, Resp:  CTA bilaterally. No wheezing/rhonchi/rales. No accessory muscle use CV:  Regular rhythm, normal rate, no murmurs, gallops, rubs GI:  Soft, non distended, non tender. normoactive bowel sounds, no hepatosplenomegaly Musculoskeletal:  No edema, warm, 2+ pulses throughout Neurologic:  Moves all extremities. AAOx2, CN II-XII reviewed Psych:  poor insight and jugdement Data Review:  
 Review and/or order of clinical lab test 
 
 
Labs:  
 
Recent Labs  
  11/24/18 
1800 WBC 10.9 HGB 12.0*  
HCT 35.6*  Recent Labs  
  11/24/18 
1800   
K 3.6  CO2 28 BUN 15  
CREA 0.99 * CA 8.6 Recent Labs  
  11/24/18 
1800 SGOT 17 ALT 16 AP 64 TBILI 0.4 TP 7.3 ALB 3.6 GLOB 3.7 No results for input(s): INR, PTP, APTT in the last 72 hours. No lab exists for component: INREXT, INREXT No results for input(s): FE, TIBC, PSAT, FERR in the last 72 hours. No results found for: FOL, RBCF No results for input(s): PH, PCO2, PO2 in the last 72 hours. Recent Labs  
  11/24/18 
1800 TROIQ <0.05 No results found for: CHOL, CHOLX, CHLST, CHOLV, HDL, LDL, LDLC, DLDLP, Jaylen Art, [address], [address] [facility], [address] [facility] Lab Results Component Value Date/Time Glucose (POC) 194 (H) 11/27/2018 04:22 PM  
 Glucose (POC) 120 (H) 11/27/2018 11:52 AM  
 Glucose (POC) 152 (H) 11/27/2018 06:46 AM  
 Glucose (POC) 158 (H) 11/26/2018 09:53 PM  
 Glucose (POC) 156 (H) 11/26/2018 04:46 PM  
 
Lab Results Component Value Date/Time Color YELLOW/STRAW 08/21/2018 07:30 PM  
 Appearance CLEAR 08/21/2018 07:30 PM  
 Specific gravity 1.015 08/21/2018 07:30 PM  
 pH (UA) 6.5 08/21/2018 07:30 PM  
 Protein NEGATIVE  08/21/2018 07:30 PM  
 Glucose NEGATIVE  08/21/2018 07:30 PM  
 Ketone NEGATIVE  08/21/2018 07:30 PM  
 Bilirubin NEGATIVE  08/21/2018 07:30 PM  
 Urobilinogen 0.2 08/21/2018 07:30 PM  
 Nitrites NEGATIVE  08/21/2018 07:30 PM  
 Leukocyte Esterase NEGATIVE  08/21/2018 07:30 PM  
 Epithelial cells FEW 08/21/2018 07:30 PM  
 Bacteria NEGATIVE  08/21/2018 07:30 PM  
 WBC 0-4 08/21/2018 07:30 PM  
 RBC 0-5 08/21/2018 07:30 PM  
 
 
 
Medications Reviewed:  
 
Current Facility-Administered Medications Medication Dose Route Frequency  lisinopril (PRINIVIL, ZESTRIL) tablet 10 mg  10 mg Oral DAILY  cloNIDine HCl (CATAPRES) tablet 0.1 mg  0.1 mg Oral Q4H PRN  
 sodium chloride (NS) flush 5-10 mL  5-10 mL IntraVENous Q8H  
 sodium chloride (NS) flush 5-10 mL  5-10 mL IntraVENous PRN  
 benztropine (COGENTIN) tablet 1 mg  1 mg Oral BID  divalproex DR (DEPAKOTE) tablet 500 mg  500 mg Oral QHS  divalproex ER (DEPAKOTE ER) 24 hour tablet 250 mg  250 mg Oral DAILY  docusate sodium (COLACE) capsule 100 mg  100 mg Oral BID  
 haloperidol (HALDOL) tablet 5 mg  5 mg Oral BID  insulin regular (NOVOLIN R, HUMULIN R) injection   SubCUTAneous AC&HS  
 glucose chewable tablet 16 g  4 Tab Oral PRN  
 dextrose (D50W) injection syrg 12.5-25 g  12.5-25 g IntraVENous PRN  
 glucagon (GLUCAGEN) injection 1 mg  1 mg IntraMUSCular PRN  pantoprazole (PROTONIX) tablet 40 mg  40 mg Oral ACB  levETIRAcetam (KEPPRA) tablet 1,000 mg  1,000 mg Oral BID  dexamethasone (DECADRON) tablet 6 mg  6 mg Oral Q6H  
 
______________________________________________________________________ EXPECTED LENGTH OF STAY: 4d 12h ACTUAL LENGTH OF STAY:          3 Lou Nguyen MD

## 2018-11-27 NOTE — PROGRESS NOTES
0800 Bedside and Verbal shift change report given to Marnie Sims, RN (oncoming nurse) by Stephane Hale, RN (offgoing nurse). Report included the following information SBAR, Kardex, ED Summary, Intake/Output, MAR, Recent Results and Cardiac Rhythm NSR. Assumed care of pt. Assessment complete. 1440 This RN attempting to call back MRI. No answer, continued to ring. 1454 This RN on phone with MRI. Holding off on MRI at this time. 1500 Pt's cousin, Valeda Scale on phone. This RN giving pt code to Valeda Scale and pt's mother Diane Conde. Gave cousin an update on pt status. Valeda Scale and Diane Lout to come visit pt tomorrow, 11/28/18, in the morning, around 1000. This RN to page palliative to inform that pt's family will be on unit tomorrow AM. 
[phone] This RN paging palliative. 1615 Palliative on unit, will meet with pt's family in the morning at 1000.  
2000 Bedside and Verbal shift change report given to Shira Tidwell RN (oncoming nurse) by Marnie Sims, RN (offgoing nurse). Report included the following information SBAR, Kardex, ED Summary, Intake/Output, MAR, Recent Results and Cardiac Rhythm NSR.

## 2018-11-28 LAB
GLUCOSE BLD STRIP.AUTO-MCNC: 121 MG/DL (ref 65–100)
GLUCOSE BLD STRIP.AUTO-MCNC: 141 MG/DL (ref 65–100)
GLUCOSE BLD STRIP.AUTO-MCNC: 182 MG/DL (ref 65–100)
GLUCOSE BLD STRIP.AUTO-MCNC: 99 MG/DL (ref 65–100)
SERVICE CMNT-IMP: ABNORMAL
SERVICE CMNT-IMP: NORMAL

## 2018-11-28 PROCEDURE — 65270000029 HC RM PRIVATE

## 2018-11-28 PROCEDURE — 82962 GLUCOSE BLOOD TEST: CPT

## 2018-11-28 PROCEDURE — 74011250636 HC RX REV CODE- 250/636: Performed by: SPECIALIST

## 2018-11-28 PROCEDURE — 74011250637 HC RX REV CODE- 250/637: Performed by: SPECIALIST

## 2018-11-28 PROCEDURE — 74011636637 HC RX REV CODE- 636/637: Performed by: HOSPITALIST

## 2018-11-28 PROCEDURE — 74011250637 HC RX REV CODE- 250/637: Performed by: HOSPITALIST

## 2018-11-28 RX ADMIN — DEXAMETHASONE 6 MG: 4 TABLET ORAL at 17:25

## 2018-11-28 RX ADMIN — DOCUSATE SODIUM 100 MG: 100 CAPSULE, LIQUID FILLED ORAL at 08:23

## 2018-11-28 RX ADMIN — BENZTROPINE MESYLATE 1 MG: 1 TABLET ORAL at 17:25

## 2018-11-28 RX ADMIN — DEXAMETHASONE 6 MG: 4 TABLET ORAL at 11:59

## 2018-11-28 RX ADMIN — BENZTROPINE MESYLATE 1 MG: 1 TABLET ORAL at 08:23

## 2018-11-28 RX ADMIN — DIVALPROEX SODIUM 500 MG: 500 TABLET, DELAYED RELEASE ORAL at 22:08

## 2018-11-28 RX ADMIN — DOCUSATE SODIUM 100 MG: 100 CAPSULE, LIQUID FILLED ORAL at 17:25

## 2018-11-28 RX ADMIN — LEVETIRACETAM 1000 MG: 500 TABLET ORAL at 17:24

## 2018-11-28 RX ADMIN — DIVALPROEX SODIUM 250 MG: 250 TABLET, EXTENDED RELEASE ORAL at 08:26

## 2018-11-28 RX ADMIN — HALOPERIDOL 5 MG: 5 TABLET ORAL at 08:23

## 2018-11-28 RX ADMIN — HUMAN INSULIN 2 UNITS: 100 INJECTION, SOLUTION SUBCUTANEOUS at 17:25

## 2018-11-28 RX ADMIN — Medication 10 ML: at 22:08

## 2018-11-28 RX ADMIN — LEVETIRACETAM 1000 MG: 500 TABLET ORAL at 08:23

## 2018-11-28 RX ADMIN — HALOPERIDOL 5 MG: 5 TABLET ORAL at 17:26

## 2018-11-28 RX ADMIN — DEXAMETHASONE 6 MG: 4 TABLET ORAL at 23:12

## 2018-11-28 NOTE — PROGRESS NOTES
TRANSFER - OUT REPORT: 
 
Verbal report given to Samantha RN(name) on Arelis Aranda  being transferred to 616(unit) for routine progression of care Report consisted of patients Situation, Background, Assessment and  
Recommendations(SBAR). Information from the following report(s) SBAR, Kardex, ED Summary, Intake/Output, MAR, Recent Results and Cardiac Rhythm SB/SR was reviewed with the receiving nurse. Lines:    
 
Opportunity for questions and clarification was provided. Patient transported with: 
 Simple Admit

## 2018-11-28 NOTE — CONSULTS
Palliative Medicine Consult  Romain: [phone] ([phone])    Patient Name: Yifan Cortez  YOB: 1965    Date of Initial Consult: November 28, 2018  Reason for Consult: Care decisions  Requesting Provider: Dr. Donna Castañeda  Primary Care Physician: Unknown, Provider     SUMMARY:   Yifan Cortez is a 52 y.o. male with a past history of mental retardation, schizophrenia, hypercholesteremia, hypertension, diabetes, who was admitted on 11/24/2018 from [facility] group home due to stroke like symptoms. Imaging revealed a large CNS mass with hemorrhage as the cause of his presentation. Symptoms have currently resolved. The mass was originally diagnosed in August presumed high-grade glioma. .  Patient has been seen by neurosurgery and no surgical intervention is being offered. Current medical issues leading to Palliative Medicine involvement include: Supportive care decisions. PALLIATIVE DIAGNOSES:   1. Confusion  2. Brain mass  3. Stroke like symptoms  4. DNR discussion  5. Physical debility     PLAN:   1. Family meeting held along with [facility] LCSW. 2. We met with the patient's mother, Jayda Domínguez and cousin Marilee Bernstein along with her   3. The patient lacks capacity for medical decisions but was able to say he had something in his head when we inquired about their understanding of the medical diagnosis  4. Informed family of the brain mass and limited treatment options. Family aware that surgery is not an option. 5. Discussed that prognosis is poor and patient is at risk for sudden decline but also noted that patient may experience a slow decline in status  6. Patient has been demanding to go back home  7. We discussed supporting the patient back at [facility]. Unfortunately, [facility] cannot accommodate hospice services and later informed us that they cannot accept the patient back. 8. Family interested in [facility] in [place] if that is an option.   Care management will work on placement  9. We will place hospice consult once disposition has been established  10. We reviewed CODE STATUS in detail and family agreed with DO NOT RESUSCITATE  6. DDNR completed  12. We will continue to support as helpful  13. Initial consult note routed to primary continuity provider  14. Communicated plan of care with: Palliative IDT       GOALS OF CARE / TREATMENT PREFERENCES:     GOALS OF CARE:  Patient/Health Care Proxy Stated Goals: Comfort      TREATMENT PREFERENCES:   Code Status: DNR    Advance Care Planning:  [x] [facility] Interdisciplinary Team has updated the ACP Navigator with [address] and Patient Capacity    Primary Decision [facility] ([address]): Mayda Jonathon  Relationship to patient:mother  Phone number: [phone]  [] Named in a scanned document   [x] Legal Next of Kin  [] Guardian    Secondary Decision Maker ([address]):   Relationship to patient:  Phone number:  [] Named in a scanned document   [] Legal Next of Kin  [] Guardian    Medical Interventions: Comfort measures   Other Instructions:   Artificially Administered Nutrition: No feeding tube     Other:    As far as possible, the palliative care team has discussed with patient / health care proxy about goals of care / treatment preferences for patient.        HISTORY:     History obtained from: Family, chart    CHIEF COMPLAINT: I want to go home    HPI/SUBJECTIVE:    The patient is:   [] Verbal and participatory  [x] Non-participatory due to:   MR.  No complaints of pain    Clinical Pain Assessment (nonverbal scale for severity on nonverbal patients):   Clinical Pain Assessment  Severity: 0          Duration: for how long has pt been experiencing pain (e.g., 2 days, 1 month, years)  Frequency: how often pain is an issue (e.g., several times per day, once every few days, constant)     FUNCTIONAL ASSESSMENT:     Palliative Performance Scale (PPS):  PPS: 40       PSYCHOSOCIAL/SPIRITUAL SCREENING: Palliative IDT has assessed this patient for cultural preferences / practices and a referral made as appropriate to needs (Cultural Services, Patient Advocacy, Ethics, etc.)    Any spiritual / Amish concerns:  [] Yes /  [x] No    Caregiver Burnout:  [] Yes /  [] No /  [x] No Caregiver Present      Anticipatory grief assessment:   [x] Normal  / [] Maladaptive       ESAS Anxiety: Anxiety: 1    ESAS Depression:          REVIEW OF SYSTEMS:     Positive and pertinent negative findings in ROS are noted above in HPI. The following systems were [x] reviewed / [] unable to be reviewed as noted in HPI  Other findings are noted below. Systems: constitutional, ears/nose/mouth/throat, respiratory, gastrointestinal, genitourinary, musculoskeletal, integumentary, neurologic, psychiatric, endocrine. Positive findings noted below. Modified ESAS Completed by: provider   Fatigue: 0 Drowsiness: 0     Pain: 0   Anxiety: 1 Nausea: 0   Anorexia: 0 Dyspnea: 0           Stool Occurrence(s): 1        PHYSICAL EXAM:     From RN flowsheet:  Wt Readings from Last 3 Encounters:   11/28/18 120 lb 5.9 oz (54.6 kg)   11/26/18 130 lb 1.1 oz (59 kg)   08/21/18 133 lb (60.3 kg)     Blood pressure 126/84, pulse 61, temperature 97.8 °F (36.6 °C), resp. rate 16, height 5' 8\" (1.727 m), weight 120 lb 5.9 oz (54.6 kg), SpO2 99 %.     Pain Scale 1: Numeric (0 - 10)  Pain Intensity 1: 0                 Last bowel movement, if known:     Constitutional: Alert, interactive  Eyes: pupils equal, anicteric  ENMT: no nasal discharge, moist mucous membranes  Cardiovascular:  Respiratory: breathing not labored, symmetric  Gastrointestinal:   Musculoskeletal: Weakness  Skin: warm, dry  Neurologic: MR  Psychiatric:   Other:       HISTORY:     Principal Problem:    Brain mass (11/24/2018)      Past Medical History:   Diagnosis Date    Hypercholesteremia     Hyperlipidemia     Hypertension     Impaction, bowel ([address]     MR (mental retardation)     Psychiatric disorder     Schizophrenia [facility])       History reviewed. No pertinent surgical history. History reviewed. No pertinent family history. History reviewed, no pertinent family history.   Social History     Tobacco Use    Smoking status: Never Smoker    Smokeless tobacco: Never Used   Substance Use Topics    Alcohol use: No     No Known Allergies   Current Facility-Administered Medications   Medication Dose Route Frequency    lisinopril (PRINIVIL, ZESTRIL) tablet 10 mg  10 mg Oral DAILY    cloNIDine HCl (CATAPRES) tablet 0.1 mg  0.1 mg Oral Q4H PRN    sodium chloride (NS) flush 5-10 mL  5-10 mL IntraVENous Q8H    sodium chloride (NS) flush 5-10 mL  5-10 mL IntraVENous PRN    benztropine (COGENTIN) tablet 1 mg  1 mg Oral BID    divalproex DR (DEPAKOTE) tablet 500 mg  500 mg Oral QHS    divalproex ER (DEPAKOTE ER) 24 hour tablet 250 mg  250 mg Oral DAILY    docusate sodium (COLACE) capsule 100 mg  100 mg Oral BID    haloperidol (HALDOL) tablet 5 mg  5 mg Oral BID    insulin regular (NOVOLIN R, HUMULIN R) injection   SubCUTAneous AC&HS    glucose chewable tablet 16 g  4 Tab Oral PRN    dextrose (D50W) injection syrg 12.5-25 g  12.5-25 g IntraVENous PRN    glucagon (GLUCAGEN) injection 1 mg  1 mg IntraMUSCular PRN    pantoprazole (PROTONIX) tablet 40 mg  40 mg Oral ACB    levETIRAcetam (KEPPRA) tablet 1,000 mg  1,000 mg Oral BID    dexamethasone (DECADRON) tablet 6 mg  6 mg Oral Q6H          LAB AND IMAGING FINDINGS:     Lab Results   Component Value Date/Time    WBC 10.9 11/24/2018 06:00 PM    HGB 12.0 (L) 11/24/2018 06:00 PM    PLATELET 168 83/74/7992 06:00 PM     Lab Results   Component Value Date/Time    Sodium 138 11/24/2018 06:00 PM    Potassium 3.6 11/24/2018 06:00 PM    Chloride 102 11/24/2018 06:00 PM    CO2 28 11/24/2018 06:00 PM    BUN 15 11/24/2018 06:00 PM    Creatinine 0.99 11/24/2018 06:00 PM    Calcium 8.6 11/24/2018 06:00 PM      Lab Results   Component Value Date/Time AST (SGOT) 17 11/24/2018 06:00 PM    Alk. phosphatase 64 11/24/2018 06:00 PM    Protein, total 7.3 11/24/2018 06:00 PM    Albumin 3.6 11/24/2018 06:00 PM    Globulin 3.7 11/24/2018 06:00 PM     Lab Results   Component Value Date/Time    INR 1.1 01/19/2011 08:06 PM    Prothrombin time 11.1 (H) 01/19/2011 08:06 PM    aPTT 31.1 01/19/2011 08:06 PM      No results found for: IRON, FE, TIBC, IBCT, PSAT, FERR   No results found for: PH, PCO2, PO2  No components found for: [place]   Lab Results   Component Value Date/Time     01/19/2011 08:06 PM    CK - MB <0.5 (L) 01/19/2011 08:06 PM                Total time: 70 minutes  Counseling / coordination time, spent as noted above: 55 minutes  > 50% counseling / coordination?:  Yes    Prolonged service was provided for  []30 min   []75 min in face to face time in the presence of the patient, spent as noted above. Time Start:   Time End:   Note: this can only be billed with 16746 (initial) or 92864 (follow up). If multiple start / stop times, list each separately.

## 2018-11-28 NOTE — PROGRESS NOTES
Primary Nurse Chris Sweeney RN and Ralph Gasca RN performed a dual skin assessment on this patient No impairment noted Eliceo score is 20

## 2018-11-28 NOTE — PROGRESS NOTES
TRANSFER - IN REPORT: 
 
Verbal report received from Valentin Trejo RN(name) on Ewell Snellen  being received from 7 ICU(unit) for routine progression of care Report consisted of patients Situation, Background, Assessment and  
Recommendations(SBAR). Information from the following report(s) SBAR, MAR, Recent Results and Med Rec Status was reviewed with the receiving nurse. Opportunity for questions and clarification was provided. Assessment completed upon patients arrival to unit and care assumed.

## 2018-11-28 NOTE — PROGRESS NOTES
0730-Bedside and Verbal shift change report given to [address] (oncoming nurse) by Latonya Bull RN (offgoing nurse).  Report included the following information SBAR, Kardex, ED Summary, Intake/Output, MAR, Recent Results and Cardiac Rhythm SB.

## 2018-11-28 NOTE — PALLIATIVE CARE
Primary Decision Maker: Nabor Rocioon Relationship to patient: Mother Phone number: [phone] [] Named in a scanned document  
[x] Legal Next of Kin 
[] Guardian Secondary Decision Maker: Willie Newsome Relationship to patient: Cousin Phone number: [phone] [] Named in a scanned document  
[] Legal Next of Kin 
[] Guardian ACP documents you current have include: 
[] Advance Directive or Living Will 
[x] Durable Do Not Resuscitate 
[] Physician Orders for Scope of Treatment (POST) [] Medical Power of  
[] Other

## 2018-11-28 NOTE — PROGRESS NOTES
Care manager met with patient's mother and aunt, Robert Xiong who would like for patient to return to [facility] if possible and once he starts deteriorating be transferred to [address] [place] [address]. CM spoke with David Vera at [facility] and they are unable to accept patient back. Referral made to [facility] Through [facility]. Attempted to contact patient's aunt , there was no answer or voice mail. Will try again later. Left voice mail message for discharge plan on Radha Denislaurie [phone] , patient's care manager with [facility] Hooker voice mail. Lia Alvarado RN,CRM

## 2018-11-28 NOTE — PALLIATIVE CARE
Palliative Medicine Social Work Lorenzo Wallis NP and I met with patient; his mother and Christina Nagy ([phone]); cousin, Fara Ortega ([phone]) and her , Osbaldo Sanford. Patient does not have capacity for decision making, but was alert and stated his preference for us to speak in room with family. Family states they had lost contact with him about two years ago; did not know he was still at [facility]. Patient glad to see them today. Talked about needing them to assume role of decision-making which they accepted. Discussed the brain tumor which was discovered in August and has now nearly doubled in size. Informed the cancer is fast growing and without cure. Discussed he is not a candidate for surgery or any treatments, given his poor functional status and mental state. Patient has denied there is anything wrong and been primary focused on leaving the hospital.  Today, he did acknowledge the \"brain tumor\". We discussed his life expectancy as being quite limited; the high risk of sudden decline and death or ongoing general decline as seen since August.  Discussed hospice as the best and recommended service to proactively manage his symptoms and keep him out of hospital.  Informed that [facility], however, is not able to receive him back at facility with hospice. Family has some concerns about the ability for [facility] to continue caring for him. Informed that we have the same concerns, especially as he continues to change. Family would prefer to have him placed in Medicaid LTC at [facility] with hospice. Osbaldo Sanford used to work there as a CNA and will talk to staff there about this possibility. Family would be able to visit often there. Patient was tearful about the prospect of being unable to return home. Discussed a plan to confirm with [facility] staff their ability to receive him in his current condition and try to get him back there if possible. Discussed his high risk of readmission without hospice in place. Family is aware of their ability to transfer his care from [facility] to [address] [place] [address] at any point they feel he is not receiving the care he needs. We also discussed a plan to initiate LTC and hospice if we see him back in the hospital soon. Discussed code status and recommendation for DNR. Both mother and cousin signed DDNR. Attending and care manager made aware of discussion and plans. Bereavement risk if high for mother who has some intellectual disability and a significant history of loss in the last two years: a son 2 years ago; a son one month ago; a sister and granddaughter in the last year. Thank you for the opportunity to be involved in the care of Jay Heredia. Bernice Talley, KATHY, [facility]- Palliative Medicine  Respecting Choices ® ACP Facilitator [phone]

## 2018-11-29 LAB
GLUCOSE BLD STRIP.AUTO-MCNC: 116 MG/DL (ref 65–100)
GLUCOSE BLD STRIP.AUTO-MCNC: 135 MG/DL (ref 65–100)
GLUCOSE BLD STRIP.AUTO-MCNC: 135 MG/DL (ref 65–100)
GLUCOSE BLD STRIP.AUTO-MCNC: 96 MG/DL (ref 65–100)
SERVICE CMNT-IMP: ABNORMAL
SERVICE CMNT-IMP: NORMAL

## 2018-11-29 PROCEDURE — 74011250636 HC RX REV CODE- 250/636: Performed by: SPECIALIST

## 2018-11-29 PROCEDURE — 74011250637 HC RX REV CODE- 250/637: Performed by: HOSPITALIST

## 2018-11-29 PROCEDURE — 74011250637 HC RX REV CODE- 250/637: Performed by: NURSE PRACTITIONER

## 2018-11-29 PROCEDURE — 74011250637 HC RX REV CODE- 250/637: Performed by: SPECIALIST

## 2018-11-29 PROCEDURE — 74011250636 HC RX REV CODE- 250/636: Performed by: INTERNAL MEDICINE

## 2018-11-29 PROCEDURE — 82962 GLUCOSE BLOOD TEST: CPT

## 2018-11-29 PROCEDURE — 65270000029 HC RM PRIVATE

## 2018-11-29 RX ORDER — DEXAMETHASONE 4 MG/1
4 TABLET ORAL EVERY 6 HOURS
Status: DISCONTINUED | OUTPATIENT
Start: 2018-11-29 | End: 2018-11-30

## 2018-11-29 RX ADMIN — Medication 10 ML: at 15:05

## 2018-11-29 RX ADMIN — DEXAMETHASONE 4 MG: 4 TABLET ORAL at 17:06

## 2018-11-29 RX ADMIN — HALOPERIDOL 5 MG: 5 TABLET ORAL at 09:13

## 2018-11-29 RX ADMIN — DIVALPROEX SODIUM 250 MG: 250 TABLET, EXTENDED RELEASE ORAL at 09:13

## 2018-11-29 RX ADMIN — HALOPERIDOL 5 MG: 5 TABLET ORAL at 17:06

## 2018-11-29 RX ADMIN — DEXAMETHASONE 6 MG: 4 TABLET ORAL at 11:07

## 2018-11-29 RX ADMIN — DOCUSATE SODIUM 100 MG: 100 CAPSULE, LIQUID FILLED ORAL at 17:06

## 2018-11-29 RX ADMIN — Medication 10 ML: at 22:16

## 2018-11-29 RX ADMIN — DIVALPROEX SODIUM 500 MG: 500 TABLET, DELAYED RELEASE ORAL at 22:16

## 2018-11-29 RX ADMIN — LEVETIRACETAM 1000 MG: 500 TABLET ORAL at 09:13

## 2018-11-29 RX ADMIN — BENZTROPINE MESYLATE 1 MG: 1 TABLET ORAL at 17:06

## 2018-11-29 RX ADMIN — PANTOPRAZOLE SODIUM 40 MG: 40 TABLET, DELAYED RELEASE ORAL at 06:37

## 2018-11-29 RX ADMIN — LEVETIRACETAM 1000 MG: 500 TABLET ORAL at 17:06

## 2018-11-29 RX ADMIN — BENZTROPINE MESYLATE 1 MG: 1 TABLET ORAL at 09:13

## 2018-11-29 RX ADMIN — Medication 10 ML: at 06:37

## 2018-11-29 RX ADMIN — DEXAMETHASONE 4 MG: 4 TABLET ORAL at 23:04

## 2018-11-29 RX ADMIN — DOCUSATE SODIUM 100 MG: 100 CAPSULE, LIQUID FILLED ORAL at 09:13

## 2018-11-29 RX ADMIN — DEXAMETHASONE 6 MG: 4 TABLET ORAL at 06:37

## 2018-11-29 RX ADMIN — LISINOPRIL 10 MG: 10 TABLET ORAL at 09:13

## 2018-11-29 NOTE — PROGRESS NOTES
CM continuing to follow. Noted Palliative LCSW notes. Patient had previous residency at [facility] (30 years).  (Saige Veraenstein ([phone]/ [phone]). Facility unable to meet his needs. Previous CM Vance Ray CM, RN sent referral to [facility] for LTC consideration. Noted patient's mother Darryle Distance and cousin  Bonita Mcneil are health care agents  [phone]. Patient is known to [address] [place] [address] ([phone]). CM called [address] ([phone]) and left message to have call returned for admission consideration. CM met briefly with patient. He stated he wants to go home. When asked where home was ,he responded \"[facility]\". CM told patient he couldn't go back to [facility] however, he could go to another place where he could get his care needs met. CM will continue to follow. IVIS Ayoub, CRM 
 
4:38p  CM called the patient's cousin Daniel James). Advised that facility had not responded in [facility], nor had they returned this CM's call. Maylin German stated her  (who retired from the facility) had called on yesterday and was told that there was bed availability. CM asked that he call again Fri. Morning. CM will follow-up with facility tomorrow.   IVIS Ayoub, CRM

## 2018-11-29 NOTE — PROGRESS NOTES
Bedside and Verbal shift change report given to [address] [place] [address] (oncoming nurse) by Alfredo Mann and [facility] [place] LPN (offgoing nurse). Report included the following information SBAR, Kardex, Intake/Output, MAR, Recent Results and Med Rec Status.

## 2018-11-29 NOTE — PALLIATIVE CARE
Palliative Medicine Social Work Patient does not have capacity. His NOK is mother, Darryle Distance. Ms. Karla Mixon has some intellectual deficits and has agreed to share decision-making with her niece, Bonita Mcneil ([phone]) who should be considered point of contact. Family meeting was held yesterday with decision for discharge to [facility] under LTC if possible with Hospice care. Our team is only following peripherally now. Will defer discharge planning to care manager. Thank you for the opportunity to be involved in the care of Jay Heredia. Bernice Talley, JASMEETW, [facility]- Palliative Medicine  Respecting Choices ® ACP Facilitator [phone]

## 2018-11-29 NOTE — PROGRESS NOTES
Hospitalist Progress Note Brianna Reddy MD 
Answering service: [phone] OR [phone] from in house phone Date of Service:  2018 NAME:  Kyra Bernard :  1965 MRN:  [id number] Admission Summary:  
25-year-old gentleman who has history of schizophrenia, currently resides at [facility], was sent as he has been seen by the staff with slurred speech and left-sided weakness off and on. 
2018 he had a CAT scan of the head done which showed a mass suggestive of high-grade glioma. Interval history / Subjective:  
 pt seen and examined in bedside, feels ok, no acute events overnight Assessment & Plan:  
 
Brain mass,suggestive of high-grade Glioma Repeat CT on admission showed significant interval increase in the size of large right temporoparietal heterogenous mass with solid and cystic component suspicious for high-grade lymphoma Neuro surgery consulted, its large cystic mass, not operable per NS Decadron,Keppra & mannitol- palliative following: -  Family agreed on discharge on Hospice. Will gradually taper down decadron. Schizophrenia- Haldol, depakot Poor insight and judgement- Refusing tx, wants to leave AMA -TDO obtained  DM SSI 
HLD Cont statin Code status:Full DVT prophylaxis: SCD Disposition: Hospice Hospital Problems  Date Reviewed: 2016 Codes Class Noted POA * (Principal) Brain mass ICD-10-CM: G93.9 ICD-9-CM: 348.9  2018 Yes Review of Systems: A comprehensive review of systems was negative except for that written in the HPI. Vital Signs:  
 Last 24hrs VS reviewed since prior progress note. Most recent are: 
Visit Vitals BP (!) 155/107 (BP 1 Location: Left arm, BP Patient Position: At rest;Supine; Head of bed elevated (Comment degrees)) Pulse (!) 56 Temp 98.3 °F (36.8 °C) Resp 17 Ht 5' 8\" (1.727 m) Wt 54.6 kg (120 lb 5.9 oz) SpO2 100% BMI 18.30 kg/m² Intake/Output Summary (Last 24 hours) at 11/29/2018 1202 Last data filed at 11/28/2018 1755 Gross per 24 hour Intake 240 ml Output  Net 240 ml Physical Examination:  
 
 
     
Constitutional:  No acute distress, pleasant Resp:  CTA bilaterally. No wheezing/rhonchi/rales. No accessory muscle use CV:  Regular rhythm, normal rate, no murmurs, GI:  Soft, non distended, non tender. normoactive bowel sounds Musculoskeletal:  No edema, warm Neurologic:  Moves all extremities. AAOx2, Psych:  poor insight and jugdement Data Review:  
 Review and/or order of clinical lab test 
 
 
Labs:  
 
No results for input(s): WBC, HGB, HCT, PLT, HGBEXT, HCTEXT, PLTEXT, HGBEXT, HCTEXT, PLTEXT in the last 72 hours. No results for input(s): NA, K, CL, CO2, BUN, CREA, GLU, CA, MG, PHOS, URICA in the last 72 hours. No results for input(s): SGOT, GPT, ALT, AP, TBIL, TBILI, TP, ALB, GLOB, GGT, AML, LPSE in the last 72 hours. No lab exists for component: AMYP, HLPSE No results for input(s): INR, PTP, APTT in the last 72 hours. No lab exists for component: INREXT, INREXT No results for input(s): FE, TIBC, PSAT, FERR in the last 72 hours. No results found for: FOL, RBCF No results for input(s): PH, PCO2, PO2 in the last 72 hours. No results for input(s): CPK, CKNDX, TROIQ in the last 72 hours. No lab exists for component: CPKMB No results found for: CHOL, CHOLX, CHLST, CHOLV, HDL, LDL, LDLC, DLDLP, TGLX, TRIGL, TRIGP, CHHD, CHHDX Lab Results Component Value Date/Time Glucose (POC) 135 (H) 11/29/2018 11:35 AM  
 Glucose (POC) 96 11/29/2018 06:14 AM  
 Glucose (POC) 182 (H) 11/28/2018 09:57 PM  
 Glucose (POC) 141 (H) 11/28/2018 04:34 PM  
 Glucose (POC) 99 11/28/2018 12:01 PM  
 
Lab Results Component Value Date/Time  Color YELLOW/STRAW 08/21/2018 07:30 PM  
 Appearance CLEAR 08/21/2018 07:30 PM  
 Specific gravity 1.015 08/21/2018 07:30 PM  
 pH (UA) 6.5 08/21/2018 07:30 PM  
 Protein NEGATIVE  08/21/2018 07:30 PM  
 Glucose NEGATIVE  08/21/2018 07:30 PM  
 Ketone NEGATIVE  08/21/2018 07:30 PM  
 Bilirubin NEGATIVE  08/21/2018 07:30 PM  
 Urobilinogen 0.2 08/21/2018 07:30 PM  
 Nitrites NEGATIVE  08/21/2018 07:30 PM  
 Leukocyte Esterase NEGATIVE  08/21/2018 07:30 PM  
 Epithelial cells FEW 08/21/2018 07:30 PM  
 Bacteria NEGATIVE  08/21/2018 07:30 PM  
 WBC 0-4 08/21/2018 07:30 PM  
 RBC 0-5 08/21/2018 07:30 PM  
 
 
 
Medications Reviewed:  
 
Current Facility-Administered Medications Medication Dose Route Frequency  influenza vaccine 2018-19 (6 mos+)(PF) (FLUARIX QUAD/FLULAVAL QUAD) injection 0.5 mL  0.5 mL IntraMUSCular PRIOR TO DISCHARGE  lisinopril (PRINIVIL, ZESTRIL) tablet 10 mg  10 mg Oral DAILY  cloNIDine HCl (CATAPRES) tablet 0.1 mg  0.1 mg Oral Q4H PRN  
 sodium chloride (NS) flush 5-10 mL  5-10 mL IntraVENous Q8H  
 sodium chloride (NS) flush 5-10 mL  5-10 mL IntraVENous PRN  
 benztropine (COGENTIN) tablet 1 mg  1 mg Oral BID  divalproex DR (DEPAKOTE) tablet 500 mg  500 mg Oral QHS  divalproex ER (DEPAKOTE ER) 24 hour tablet 250 mg  250 mg Oral DAILY  docusate sodium (COLACE) capsule 100 mg  100 mg Oral BID  
 haloperidol (HALDOL) tablet 5 mg  5 mg Oral BID  insulin regular (NOVOLIN R, HUMULIN R) injection   SubCUTAneous AC&HS  
 glucose chewable tablet 16 g  4 Tab Oral PRN  
 dextrose (D50W) injection syrg 12.5-25 g  12.5-25 g IntraVENous PRN  
 glucagon (GLUCAGEN) injection 1 mg  1 mg IntraMUSCular PRN  pantoprazole (PROTONIX) tablet 40 mg  40 mg Oral ACB  levETIRAcetam (KEPPRA) tablet 1,000 mg  1,000 mg Oral BID  dexamethasone (DECADRON) tablet 6 mg  6 mg Oral Q6H  
 
______________________________________________________________________ EXPECTED LENGTH OF STAY: 4d 12h ACTUAL LENGTH OF STAY:          5 Raymond Garza MD

## 2018-11-29 NOTE — PROGRESS NOTES
Bedside shift change report given to TUCKER Ybarra by LUKE Menendez. Report included the following information SBAR, Kardex and MAR.

## 2018-11-30 LAB
GLUCOSE BLD STRIP.AUTO-MCNC: 186 MG/DL (ref 65–100)
GLUCOSE BLD STRIP.AUTO-MCNC: 198 MG/DL (ref 65–100)
GLUCOSE BLD STRIP.AUTO-MCNC: 99 MG/DL (ref 65–100)
GLUCOSE BLD STRIP.AUTO-MCNC: 99 MG/DL (ref 65–100)
SERVICE CMNT-IMP: ABNORMAL
SERVICE CMNT-IMP: ABNORMAL
SERVICE CMNT-IMP: NORMAL
SERVICE CMNT-IMP: NORMAL

## 2018-11-30 PROCEDURE — 82962 GLUCOSE BLOOD TEST: CPT

## 2018-11-30 PROCEDURE — 74011250636 HC RX REV CODE- 250/636: Performed by: INTERNAL MEDICINE

## 2018-11-30 PROCEDURE — 74011636637 HC RX REV CODE- 636/637: Performed by: HOSPITALIST

## 2018-11-30 PROCEDURE — 65270000029 HC RM PRIVATE

## 2018-11-30 PROCEDURE — 74011250637 HC RX REV CODE- 250/637: Performed by: NURSE PRACTITIONER

## 2018-11-30 PROCEDURE — 74011250637 HC RX REV CODE- 250/637: Performed by: SPECIALIST

## 2018-11-30 PROCEDURE — 74011250637 HC RX REV CODE- 250/637: Performed by: HOSPITALIST

## 2018-11-30 RX ORDER — DEXAMETHASONE 4 MG/1
4 TABLET ORAL EVERY 8 HOURS
Status: DISCONTINUED | OUTPATIENT
Start: 2018-11-30 | End: 2018-12-01

## 2018-11-30 RX ADMIN — DIVALPROEX SODIUM 500 MG: 500 TABLET, DELAYED RELEASE ORAL at 22:50

## 2018-11-30 RX ADMIN — HALOPERIDOL 5 MG: 5 TABLET ORAL at 17:13

## 2018-11-30 RX ADMIN — LEVETIRACETAM 1000 MG: 500 TABLET ORAL at 17:13

## 2018-11-30 RX ADMIN — DOCUSATE SODIUM 100 MG: 100 CAPSULE, LIQUID FILLED ORAL at 08:38

## 2018-11-30 RX ADMIN — LISINOPRIL 10 MG: 10 TABLET ORAL at 08:38

## 2018-11-30 RX ADMIN — LEVETIRACETAM 1000 MG: 500 TABLET ORAL at 08:38

## 2018-11-30 RX ADMIN — DEXAMETHASONE 4 MG: 4 TABLET ORAL at 07:16

## 2018-11-30 RX ADMIN — DIVALPROEX SODIUM 250 MG: 250 TABLET, EXTENDED RELEASE ORAL at 08:38

## 2018-11-30 RX ADMIN — DEXAMETHASONE 4 MG: 4 TABLET ORAL at 14:06

## 2018-11-30 RX ADMIN — HUMAN INSULIN 2 UNITS: 100 INJECTION, SOLUTION SUBCUTANEOUS at 17:13

## 2018-11-30 RX ADMIN — HALOPERIDOL 5 MG: 5 TABLET ORAL at 08:38

## 2018-11-30 RX ADMIN — PANTOPRAZOLE SODIUM 40 MG: 40 TABLET, DELAYED RELEASE ORAL at 07:16

## 2018-11-30 RX ADMIN — DEXAMETHASONE 4 MG: 4 TABLET ORAL at 22:50

## 2018-11-30 RX ADMIN — BENZTROPINE MESYLATE 1 MG: 1 TABLET ORAL at 17:12

## 2018-11-30 RX ADMIN — BENZTROPINE MESYLATE 1 MG: 1 TABLET ORAL at 08:38

## 2018-11-30 RX ADMIN — Medication 10 ML: at 14:06

## 2018-11-30 NOTE — PROGRESS NOTES
Bedside shift change report given to Stephania Charlton RN (oncoming nurse) by Roselyn Tee RN (offgoing nurse). Report included the following information MAR.

## 2018-11-30 NOTE — PROGRESS NOTES
Problem: Falls - Risk of 
Goal: *Absence of Falls Document Katia Queen Fall Risk and appropriate interventions in the flowsheet. Outcome: Progressing Towards Goal 
Fall Risk Interventions: 
Mobility Interventions: Communicate number of staff needed for ambulation/transfer, Patient to call before getting OOB Mentation Interventions: Adequate sleep, hydration, pain control, Door open when patient unattended, More frequent rounding, Room close to nurse's station, Toileting rounds Medication Interventions: Evaluate medications/consider consulting pharmacy, Patient to call before getting OOB Elimination Interventions: Call light in reach, Elevated toilet seat, Patient to call for help with toileting needs, Toileting schedule/hourly rounds History of Falls Interventions: Door open when patient unattended, Room close to nurse's station
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
## 2018-12-01 LAB
GLUCOSE BLD STRIP.AUTO-MCNC: 100 MG/DL (ref 65–100)
GLUCOSE BLD STRIP.AUTO-MCNC: 105 MG/DL (ref 65–100)
GLUCOSE BLD STRIP.AUTO-MCNC: 114 MG/DL (ref 65–100)
GLUCOSE BLD STRIP.AUTO-MCNC: 126 MG/DL (ref 65–100)
SERVICE CMNT-IMP: ABNORMAL
SERVICE CMNT-IMP: NORMAL

## 2018-12-01 PROCEDURE — 74011250636 HC RX REV CODE- 250/636: Performed by: INTERNAL MEDICINE

## 2018-12-01 PROCEDURE — 74011250637 HC RX REV CODE- 250/637: Performed by: SPECIALIST

## 2018-12-01 PROCEDURE — 65270000029 HC RM PRIVATE

## 2018-12-01 PROCEDURE — 74011250637 HC RX REV CODE- 250/637: Performed by: NURSE PRACTITIONER

## 2018-12-01 PROCEDURE — 74011250637 HC RX REV CODE- 250/637: Performed by: HOSPITALIST

## 2018-12-01 PROCEDURE — 82962 GLUCOSE BLOOD TEST: CPT

## 2018-12-01 RX ORDER — DEXAMETHASONE 4 MG/1
4 TABLET ORAL EVERY 12 HOURS
Status: DISCONTINUED | OUTPATIENT
Start: 2018-12-01 | End: 2018-12-03

## 2018-12-01 RX ADMIN — DIVALPROEX SODIUM 500 MG: 500 TABLET, DELAYED RELEASE ORAL at 21:45

## 2018-12-01 RX ADMIN — BENZTROPINE MESYLATE 1 MG: 1 TABLET ORAL at 17:24

## 2018-12-01 RX ADMIN — LEVETIRACETAM 1000 MG: 500 TABLET ORAL at 17:24

## 2018-12-01 RX ADMIN — DEXAMETHASONE 4 MG: 4 TABLET ORAL at 07:16

## 2018-12-01 RX ADMIN — PANTOPRAZOLE SODIUM 40 MG: 40 TABLET, DELAYED RELEASE ORAL at 07:16

## 2018-12-01 RX ADMIN — LISINOPRIL 10 MG: 10 TABLET ORAL at 08:00

## 2018-12-01 RX ADMIN — HALOPERIDOL 5 MG: 5 TABLET ORAL at 08:00

## 2018-12-01 RX ADMIN — HALOPERIDOL 5 MG: 5 TABLET ORAL at 17:24

## 2018-12-01 RX ADMIN — DEXAMETHASONE 4 MG: 4 TABLET ORAL at 20:38

## 2018-12-01 RX ADMIN — LEVETIRACETAM 1000 MG: 500 TABLET ORAL at 08:00

## 2018-12-01 RX ADMIN — BENZTROPINE MESYLATE 1 MG: 1 TABLET ORAL at 08:00

## 2018-12-01 RX ADMIN — DIVALPROEX SODIUM 250 MG: 250 TABLET, EXTENDED RELEASE ORAL at 08:00

## 2018-12-01 NOTE — PROGRESS NOTES
Hospitalist Progress Note Fox Carver MD 
Answering service: [phone] OR [phone] from in house phone Date of Service:  2018 NAME:  Jayda Wilhelm :  1965 MRN:  [id number] Admission Summary:  
57-year-old gentleman who has history of schizophrenia, currently resides at [facility], was sent as he has been seen by the staff with slurred speech and left-sided weakness off and on. 
2018 he had a CAT scan of the head done which showed a mass suggestive of high-grade glioma. Interval history / Subjective:  
 pt seen and examined in bedside, feels well, comfortable. Hospice dc after weekend Assessment & Plan:  
 
Brain mass,suggestive of high-grade Glioma Repeat CT on admission showed significant interval increase in the size of large right temporoparietal heterogenous mass with solid and cystic component suspicious for high-grade lymphoma Neuro surgery consulted, its large cystic mass, not operable per NS Decadron,Keppra & mannitol- palliative following: -  Family agreed on discharge on Hospice. Will gradually taper down decadron. Schizophrenia- Haldol, depakot Poor insight and judgement- Refusing tx, wants to leave AMA -TDO obtained  DM SSI 
HLD Cont statin Code status:Full DVT prophylaxis: SCD Disposition: Hospice Hospital Problems  Date Reviewed: 2016 Codes Class Noted POA * (Principal) Brain mass ICD-10-CM: G93.9 ICD-9-CM: 348.9  2018 Yes Review of Systems: A comprehensive review of systems was negative except for that written in the HPI. Vital Signs:  
 Last 24hrs VS reviewed since prior progress note. Most recent are: 
Visit Vitals BP (!) 130/92 (BP 1 Location: Left arm, BP Patient Position: At rest) Pulse 83 Temp 98.3 °F (36.8 °C) Resp 18 Ht 5' 8\" (1.727 m) Wt 54.6 kg (120 lb 5.9 oz) SpO2 98% BMI 18.30 kg/m² Intake/Output Summary (Last 24 hours) at 12/1/2018 1209 Last data filed at 11/30/2018 1314 Gross per 24 hour Intake 360 ml Output  Net 360 ml Physical Examination:  
 
 
     
Constitutional:  No acute distress, pleasant Resp:  CTA bilaterally. No wheezing/rhonchi/rales. No accessory muscle use CV:  Regular rhythm, normal rate, no murmurs, GI:  Soft, non distended, non tender. normoactive bowel sounds Musculoskeletal:  No edema, warm Neurologic:  Moves all extremities. AAOx2, Psych:  poor insight and jugdement Data Review:  
 Review and/or order of clinical lab test 
 
 
Labs:  
 
No results for input(s): WBC, HGB, HCT, PLT, HGBEXT, HCTEXT, PLTEXT, HGBEXT, HCTEXT, PLTEXT in the last 72 hours. No results for input(s): NA, K, CL, CO2, BUN, CREA, GLU, CA, MG, PHOS, URICA in the last 72 hours. No results for input(s): SGOT, GPT, ALT, AP, TBIL, TBILI, TP, ALB, GLOB, GGT, AML, LPSE in the last 72 hours. No lab exists for component: AMYP, HLPSE No results for input(s): INR, PTP, APTT in the last 72 hours. No lab exists for component: INREXT, INREXT No results for input(s): FE, TIBC, PSAT, FERR in the last 72 hours. No results found for: FOL, RBCF No results for input(s): PH, PCO2, PO2 in the last 72 hours. No results for input(s): CPK, CKNDX, TROIQ in the last 72 hours. No lab exists for component: CPKMB No results found for: CHOL, CHOLX, CHLST, CHOLV, HDL, LDL, LDLC, DLDLP, TGLX, TRIGL, TRIGP, CHHD, CHHDX Lab Results Component Value Date/Time Glucose (POC) 100 12/01/2018 11:06 AM  
 Glucose (POC) 105 (H) 12/01/2018 06:46 AM  
 Glucose (POC) 198 (H) 11/30/2018 09:16 PM  
 Glucose (POC) 186 (H) 11/30/2018 04:25 PM  
 Glucose (POC) 99 11/30/2018 11:44 AM  
 
Lab Results Component Value Date/Time  Color YELLOW/STRAW 08/21/2018 07:30 PM  
 Appearance CLEAR 08/21/2018 07:30 PM  
 Specific gravity 1.015 08/21/2018 07:30 PM  
 pH (UA) 6.5 08/21/2018 07:30 PM  
 Protein NEGATIVE  08/21/2018 07:30 PM  
 Glucose NEGATIVE  08/21/2018 07:30 PM  
 Ketone NEGATIVE  08/21/2018 07:30 PM  
 Bilirubin NEGATIVE  08/21/2018 07:30 PM  
 Urobilinogen 0.2 08/21/2018 07:30 PM  
 Nitrites NEGATIVE  08/21/2018 07:30 PM  
 Leukocyte Esterase NEGATIVE  08/21/2018 07:30 PM  
 Epithelial cells FEW 08/21/2018 07:30 PM  
 Bacteria NEGATIVE  08/21/2018 07:30 PM  
 WBC 0-4 08/21/2018 07:30 PM  
 RBC 0-5 08/21/2018 07:30 PM  
 
 
 
Medications Reviewed:  
 
Current Facility-Administered Medications Medication Dose Route Frequency  dexamethasone (DECADRON) tablet 4 mg  4 mg Oral Q8H  
 influenza vaccine 2018-19 (6 mos+)(PF) (FLUARIX QUAD/FLULAVAL QUAD) injection 0.5 mL  0.5 mL IntraMUSCular PRIOR TO DISCHARGE  lisinopril (PRINIVIL, ZESTRIL) tablet 10 mg  10 mg Oral DAILY  cloNIDine HCl (CATAPRES) tablet 0.1 mg  0.1 mg Oral Q4H PRN  
 sodium chloride (NS) flush 5-10 mL  5-10 mL IntraVENous Q8H  
 sodium chloride (NS) flush 5-10 mL  5-10 mL IntraVENous PRN  
 benztropine (COGENTIN) tablet 1 mg  1 mg Oral BID  divalproex DR (DEPAKOTE) tablet 500 mg  500 mg Oral QHS  divalproex ER (DEPAKOTE ER) 24 hour tablet 250 mg  250 mg Oral DAILY  docusate sodium (COLACE) capsule 100 mg  100 mg Oral BID  
 haloperidol (HALDOL) tablet 5 mg  5 mg Oral BID  insulin regular (NOVOLIN R, HUMULIN R) injection   SubCUTAneous AC&HS  
 glucose chewable tablet 16 g  4 Tab Oral PRN  
 dextrose (D50W) injection syrg 12.5-25 g  12.5-25 g IntraVENous PRN  
 glucagon (GLUCAGEN) injection 1 mg  1 mg IntraMUSCular PRN  pantoprazole (PROTONIX) tablet 40 mg  40 mg Oral ACB  levETIRAcetam (KEPPRA) tablet 1,000 mg  1,000 mg Oral BID  
 
______________________________________________________________________ EXPECTED LENGTH OF STAY: 4d 12h ACTUAL LENGTH OF STAY:          7 Sherrill Townsend MD

## 2018-12-02 LAB
GLUCOSE BLD STRIP.AUTO-MCNC: 113 MG/DL (ref 65–100)
GLUCOSE BLD STRIP.AUTO-MCNC: 116 MG/DL (ref 65–100)
GLUCOSE BLD STRIP.AUTO-MCNC: 127 MG/DL (ref 65–100)
GLUCOSE BLD STRIP.AUTO-MCNC: 140 MG/DL (ref 65–100)
SERVICE CMNT-IMP: ABNORMAL

## 2018-12-02 PROCEDURE — 74011250637 HC RX REV CODE- 250/637: Performed by: SPECIALIST

## 2018-12-02 PROCEDURE — 82962 GLUCOSE BLOOD TEST: CPT

## 2018-12-02 PROCEDURE — 65270000029 HC RM PRIVATE

## 2018-12-02 PROCEDURE — 74011250636 HC RX REV CODE- 250/636: Performed by: INTERNAL MEDICINE

## 2018-12-02 PROCEDURE — 74011636637 HC RX REV CODE- 636/637: Performed by: HOSPITALIST

## 2018-12-02 PROCEDURE — 74011250637 HC RX REV CODE- 250/637: Performed by: NURSE PRACTITIONER

## 2018-12-02 PROCEDURE — 74011250637 HC RX REV CODE- 250/637: Performed by: HOSPITALIST

## 2018-12-02 RX ADMIN — LISINOPRIL 10 MG: 10 TABLET ORAL at 08:04

## 2018-12-02 RX ADMIN — DEXAMETHASONE 4 MG: 4 TABLET ORAL at 21:14

## 2018-12-02 RX ADMIN — DIVALPROEX SODIUM 250 MG: 250 TABLET, EXTENDED RELEASE ORAL at 08:04

## 2018-12-02 RX ADMIN — BENZTROPINE MESYLATE 1 MG: 1 TABLET ORAL at 08:04

## 2018-12-02 RX ADMIN — LEVETIRACETAM 1000 MG: 500 TABLET ORAL at 17:13

## 2018-12-02 RX ADMIN — Medication 10 ML: at 21:14

## 2018-12-02 RX ADMIN — DIVALPROEX SODIUM 500 MG: 500 TABLET, DELAYED RELEASE ORAL at 21:14

## 2018-12-02 RX ADMIN — LEVETIRACETAM 1000 MG: 500 TABLET ORAL at 08:04

## 2018-12-02 RX ADMIN — BENZTROPINE MESYLATE 1 MG: 1 TABLET ORAL at 17:13

## 2018-12-02 RX ADMIN — DEXAMETHASONE 4 MG: 4 TABLET ORAL at 08:04

## 2018-12-02 RX ADMIN — PANTOPRAZOLE SODIUM 40 MG: 40 TABLET, DELAYED RELEASE ORAL at 07:05

## 2018-12-02 RX ADMIN — HALOPERIDOL 5 MG: 5 TABLET ORAL at 08:04

## 2018-12-02 RX ADMIN — HUMAN INSULIN 2 UNITS: 100 INJECTION, SOLUTION SUBCUTANEOUS at 17:14

## 2018-12-02 RX ADMIN — HALOPERIDOL 5 MG: 5 TABLET ORAL at 17:13

## 2018-12-02 NOTE — PROGRESS NOTES
Hospitalist Progress Note Timur García MD 
Answering service: [phone] OR [phone] from in house phone Date of Service:  2018 NAME:  Dionne Teran :  1965 MRN:  [id number] Admission Summary:  
47-year-old gentleman who has history of schizophrenia, currently resides at [facility], was sent as he has been seen by the staff with slurred speech and left-sided weakness off and on. 
2018 he had a CAT scan of the head done which showed a mass suggestive of high-grade glioma. Interval history / Subjective:  
 pt seen and examined in bedside, very comfortable, asking when is he leaving Assessment & Plan:  
 
Brain mass,suggestive of high-grade Glioma Repeat CT on admission showed significant interval increase in the size of large right temporoparietal heterogenous mass with solid and cystic component suspicious for high-grade lymphoma Neuro surgery consulted, its large cystic mass, not operable per NS Decadron,Keppra & mannitol- palliative following: -  Family agreed on discharge on Hospice. Will gradually taper down decadron. Schizophrenia- Haldol, depakot Poor insight and judgement- Refusing tx, wants to leave AMA -TDO obtained  DM SSI 
HLD Cont statin Code status:Full DVT prophylaxis: SCD Disposition: Hospice Hospital Problems  Date Reviewed: 2016 Codes Class Noted POA * (Principal) Brain mass ICD-10-CM: G93.9 ICD-9-CM: 348.9  2018 Yes Review of Systems: A comprehensive review of systems was negative except for that written in the HPI. Vital Signs:  
 Last 24hrs VS reviewed since prior progress note. Most recent are: 
Visit Vitals /82 (BP 1 Location: Left arm, BP Patient Position: At rest) Pulse 63 Temp 98.2 °F (36.8 °C) Resp 18 Ht 5' 8\" (1.727 m) Wt 54.6 kg (120 lb 5.9 oz) SpO2 100% BMI 18.30 kg/m² Intake/Output Summary (Last 24 hours) at 12/2/2018 1139 Last data filed at 12/2/2018 1012 Gross per 24 hour Intake 960 ml Output  Net 960 ml Physical Examination:  
 
 
     
Constitutional:  No acute distress, pleasant Resp:  CTA bilaterally. No wheezing/rhonchi/rales. No accessory muscle use CV:  Regular rhythm, normal rate, no murmurs, GI:  Soft, non distended, non tender. normoactive bowel sounds Musculoskeletal:  No edema, warm Neurologic:  Moves all extremities. AAOx2, Psych:  poor insight and jugdement Data Review:  
 Review and/or order of clinical lab test 
 
 
Labs:  
 
No results for input(s): WBC, HGB, HCT, PLT, HGBEXT, HCTEXT, PLTEXT, HGBEXT, HCTEXT, PLTEXT in the last 72 hours. No results for input(s): NA, K, CL, CO2, BUN, CREA, GLU, CA, MG, PHOS, URICA in the last 72 hours. No results for input(s): SGOT, GPT, ALT, AP, TBIL, TBILI, TP, ALB, GLOB, GGT, AML, LPSE in the last 72 hours. No lab exists for component: AMYP, HLPSE No results for input(s): INR, PTP, APTT in the last 72 hours. No lab exists for component: INREXT, INREXT No results for input(s): FE, TIBC, PSAT, FERR in the last 72 hours. No results found for: FOL, RBCF No results for input(s): PH, PCO2, PO2 in the last 72 hours. No results for input(s): CPK, CKNDX, TROIQ in the last 72 hours. No lab exists for component: CPKMB No results found for: CHOL, CHOLX, CHLST, CHOLV, HDL, LDL, LDLC, DLDLP, TGLX, TRIGL, TRIGP, CHHD, CHHDX Lab Results Component Value Date/Time Glucose (POC) 116 (H) 12/02/2018 11:11 AM  
 Glucose (POC) 113 (H) 12/02/2018 06:42 AM  
 Glucose (POC) 126 (H) 12/01/2018 09:08 PM  
 Glucose (POC) 114 (H) 12/01/2018 04:15 PM  
 Glucose (POC) 100 12/01/2018 11:06 AM  
 
Lab Results Component Value Date/Time  Color YELLOW/STRAW 08/21/2018 07:30 PM  
 Appearance CLEAR 08/21/2018 07:30 PM  
 Specific gravity 1.015 08/21/2018 07:30 PM  
 pH (UA) 6.5 08/21/2018 07:30 PM  
 Protein NEGATIVE  08/21/2018 07:30 PM  
 Glucose NEGATIVE  08/21/2018 07:30 PM  
 Ketone NEGATIVE  08/21/2018 07:30 PM  
 Bilirubin NEGATIVE  08/21/2018 07:30 PM  
 Urobilinogen 0.2 08/21/2018 07:30 PM  
 Nitrites NEGATIVE  08/21/2018 07:30 PM  
 Leukocyte Esterase NEGATIVE  08/21/2018 07:30 PM  
 Epithelial cells FEW 08/21/2018 07:30 PM  
 Bacteria NEGATIVE  08/21/2018 07:30 PM  
 WBC 0-4 08/21/2018 07:30 PM  
 RBC 0-5 08/21/2018 07:30 PM  
 
 
 
Medications Reviewed:  
 
Current Facility-Administered Medications Medication Dose Route Frequency  dexamethasone (DECADRON) tablet 4 mg  4 mg Oral Q12H  
 influenza vaccine 2018-19 (6 mos+)(PF) (FLUARIX QUAD/FLULAVAL QUAD) injection 0.5 mL  0.5 mL IntraMUSCular PRIOR TO DISCHARGE  lisinopril (PRINIVIL, ZESTRIL) tablet 10 mg  10 mg Oral DAILY  cloNIDine HCl (CATAPRES) tablet 0.1 mg  0.1 mg Oral Q4H PRN  
 sodium chloride (NS) flush 5-10 mL  5-10 mL IntraVENous Q8H  
 sodium chloride (NS) flush 5-10 mL  5-10 mL IntraVENous PRN  
 benztropine (COGENTIN) tablet 1 mg  1 mg Oral BID  divalproex DR (DEPAKOTE) tablet 500 mg  500 mg Oral QHS  divalproex ER (DEPAKOTE ER) 24 hour tablet 250 mg  250 mg Oral DAILY  docusate sodium (COLACE) capsule 100 mg  100 mg Oral BID  
 haloperidol (HALDOL) tablet 5 mg  5 mg Oral BID  insulin regular (NOVOLIN R, HUMULIN R) injection   SubCUTAneous AC&HS  
 glucose chewable tablet 16 g  4 Tab Oral PRN  
 dextrose (D50W) injection syrg 12.5-25 g  12.5-25 g IntraVENous PRN  
 glucagon (GLUCAGEN) injection 1 mg  1 mg IntraMUSCular PRN  pantoprazole (PROTONIX) tablet 40 mg  40 mg Oral ACB  levETIRAcetam (KEPPRA) tablet 1,000 mg  1,000 mg Oral BID  
 
______________________________________________________________________ EXPECTED LENGTH OF STAY: 4d 12h ACTUAL LENGTH OF STAY:          8 Grace Villafuerte MD

## 2018-12-03 LAB
GLUCOSE BLD STRIP.AUTO-MCNC: 105 MG/DL (ref 65–100)
GLUCOSE BLD STRIP.AUTO-MCNC: 106 MG/DL (ref 65–100)
GLUCOSE BLD STRIP.AUTO-MCNC: 144 MG/DL (ref 65–100)
GLUCOSE BLD STRIP.AUTO-MCNC: 170 MG/DL (ref 65–100)
SERVICE CMNT-IMP: ABNORMAL

## 2018-12-03 PROCEDURE — 74011250636 HC RX REV CODE- 250/636: Performed by: INTERNAL MEDICINE

## 2018-12-03 PROCEDURE — 74011250637 HC RX REV CODE- 250/637: Performed by: SPECIALIST

## 2018-12-03 PROCEDURE — 65270000029 HC RM PRIVATE

## 2018-12-03 PROCEDURE — 74011636637 HC RX REV CODE- 636/637: Performed by: HOSPITALIST

## 2018-12-03 PROCEDURE — 82962 GLUCOSE BLOOD TEST: CPT

## 2018-12-03 PROCEDURE — 74011250637 HC RX REV CODE- 250/637: Performed by: HOSPITALIST

## 2018-12-03 PROCEDURE — 74011250637 HC RX REV CODE- 250/637: Performed by: NURSE PRACTITIONER

## 2018-12-03 RX ORDER — DEXAMETHASONE 4 MG/1
2 TABLET ORAL 2 TIMES DAILY WITH MEALS
Status: DISCONTINUED | OUTPATIENT
Start: 2018-12-03 | End: 2018-12-13 | Stop reason: HOSPADM

## 2018-12-03 RX ADMIN — DIVALPROEX SODIUM 500 MG: 500 TABLET, DELAYED RELEASE ORAL at 22:00

## 2018-12-03 RX ADMIN — BENZTROPINE MESYLATE 1 MG: 1 TABLET ORAL at 09:31

## 2018-12-03 RX ADMIN — LEVETIRACETAM 1000 MG: 500 TABLET ORAL at 18:43

## 2018-12-03 RX ADMIN — HALOPERIDOL 5 MG: 5 TABLET ORAL at 09:30

## 2018-12-03 RX ADMIN — DOCUSATE SODIUM 100 MG: 100 CAPSULE, LIQUID FILLED ORAL at 18:44

## 2018-12-03 RX ADMIN — LISINOPRIL 10 MG: 10 TABLET ORAL at 09:30

## 2018-12-03 RX ADMIN — HUMAN INSULIN 2 UNITS: 100 INJECTION, SOLUTION SUBCUTANEOUS at 17:56

## 2018-12-03 RX ADMIN — DEXAMETHASONE 2 MG: 4 TABLET ORAL at 18:44

## 2018-12-03 RX ADMIN — LEVETIRACETAM 1000 MG: 500 TABLET ORAL at 09:30

## 2018-12-03 RX ADMIN — DIVALPROEX SODIUM 250 MG: 250 TABLET, EXTENDED RELEASE ORAL at 09:30

## 2018-12-03 RX ADMIN — BENZTROPINE MESYLATE 1 MG: 1 TABLET ORAL at 18:43

## 2018-12-03 RX ADMIN — PANTOPRAZOLE SODIUM 40 MG: 40 TABLET, DELAYED RELEASE ORAL at 07:30

## 2018-12-03 RX ADMIN — DEXAMETHASONE 4 MG: 4 TABLET ORAL at 09:30

## 2018-12-03 RX ADMIN — HALOPERIDOL 5 MG: 5 TABLET ORAL at 18:43

## 2018-12-03 RX ADMIN — DOCUSATE SODIUM 100 MG: 100 CAPSULE, LIQUID FILLED ORAL at 09:31

## 2018-12-03 NOTE — PROGRESS NOTES
Bedside and Verbal shift change report given to Kimberly RN (oncoming nurse) by Romario Bro (offgoing nurse). Report included the following information SBAR.

## 2018-12-03 NOTE — PROGRESS NOTES
CM received call from Margarie Mohs (TONY at [facility] ) at 9:30a. She was informed that level 2 will be needed. This will delay discharge. Taylor Katz, BSW, CRM.

## 2018-12-03 NOTE — PROGRESS NOTES
..Bedside shift change report given to LUKE Pedroza (oncoming nurse) by Tushar Menendez (offgoing nurse). Report included the following information SBAR, Kardex and [facility] Pt has no IV site per nurse . MD aware. Kayden Turner

## 2018-12-03 NOTE — PROGRESS NOTES
Problem: Falls - Risk of 
Goal: *Absence of Falls Document Tyson Shadow Fall Risk and appropriate interventions in the flowsheet. Outcome: Progressing Towards Goal 
Fall Risk Interventions: 
Mobility Interventions: Bed/chair exit alarm Mentation Interventions: Evaluate medications/consider consulting pharmacy Medication Interventions: Patient to call before getting OOB, Evaluate medications/consider consulting pharmacy Elimination Interventions: Call light in reach History of Falls Interventions: Bed/chair exit alarm

## 2018-12-03 NOTE — PROGRESS NOTES
Hospitalist Progress Note Keisha Cortez MD 
Answering service: [phone] OR [phone] from in house phone Date of Service:  12/3/2018 NAME:  Jose Tarango :  1965 MRN:  [id number] Admission Summary:  
25-year-old gentleman who has history of schizophrenia, currently resides at [place] [facility], was sent as he has been seen by the staff with slurred speech and left-sided weakness off and on. 
2018 he had a CAT scan of the head done which showed a mass suggestive of high-grade glioma. Interval history / Subjective:  
 pt seen and examined in bedside, very comfortable, asking when is he leaving, urging to leave. Assessment & Plan:  
 
Brain mass,suggestive of high-grade Glioma Repeat CT on admission showed significant interval increase in the size of large right temporoparietal heterogenous mass with solid and cystic component suspicious for high-grade lymphoma Neuro surgery consulted, its large cystic mass, not operable per NS Decadron,Keppra & mannitol- palliative following: -  Family agreed on discharge on Hospice. Will gradually taper down decadron. Schizophrenia- Haldol, depakot Poor insight and judgement- Refusing tx, wants to leave AMA -TDO obtained  DM SSI 
HLD Cont statin Code status:Full DVT prophylaxis: SCD Disposition: Hospice Hospital Problems  Date Reviewed: 2016 Codes Class Noted POA * (Principal) Brain mass ICD-10-CM: G93.9 ICD-9-CM: 348.9  2018 Yes Review of Systems: A comprehensive review of systems was negative except for that written in the HPI. Vital Signs:  
 Last 24hrs VS reviewed since prior progress note. Most recent are: 
Visit Vitals /79 (BP 1 Location: Right arm, BP Patient Position: Sitting) Pulse 69 Temp 98.1 °F (36.7 °C) Resp 17 Ht 5' 8\" (1.727 m) Wt 54.6 kg (120 lb 5.9 oz) SpO2 100% BMI 18.30 kg/m² Intake/Output Summary (Last 24 hours) at 12/3/2018 1550 Last data filed at 12/2/2018 1750 Gross per 24 hour Intake 360 ml Output  Net 360 ml Physical Examination:  
 
 
     
Constitutional:  No acute distress, pleasant Resp:  CTA bilaterally. No wheezing/rhonchi/rales. No accessory muscle use CV:  Regular rhythm, normal rate, no murmurs, GI:  Soft, non distended, non tender. normoactive bowel sounds Musculoskeletal:  No edema, warm Neurologic:  Moves all extremities. AAOx2, Psych:  poor insight and jugdement Data Review:  
 Review and/or order of clinical lab test 
 
 
Labs:  
 
No results for input(s): WBC, HGB, HCT, PLT, HGBEXT, HCTEXT, PLTEXT, HGBEXT, HCTEXT, PLTEXT in the last 72 hours. No results for input(s): NA, K, CL, CO2, BUN, CREA, GLU, CA, MG, PHOS, URICA in the last 72 hours. No results for input(s): SGOT, GPT, ALT, AP, TBIL, TBILI, TP, ALB, GLOB, GGT, AML, LPSE in the last 72 hours. No lab exists for component: AMYP, HLPSE No results for input(s): INR, PTP, APTT in the last 72 hours. No lab exists for component: INREXT, INREXT No results for input(s): FE, TIBC, PSAT, FERR in the last 72 hours. No results found for: FOL, RBCF No results for input(s): PH, PCO2, PO2 in the last 72 hours. No results for input(s): CPK, CKNDX, TROIQ in the last 72 hours. No lab exists for component: CPKMB No results found for: CHOL, CHOLX, CHLST, CHOLV, HDL, LDL, LDLC, DLDLP, TGLX, TRIGL, TRIGP, CHHD, CHHDX Lab Results Component Value Date/Time Glucose (POC) 105 (H) 12/03/2018 11:10 AM  
 Glucose (POC) 106 (H) 12/03/2018 06:37 AM  
 Glucose (POC) 127 (H) 12/02/2018 08:30 PM  
 Glucose (POC) 140 (H) 12/02/2018 04:15 PM  
 Glucose (POC) 116 (H) 12/02/2018 11:11 AM  
 
Lab Results Component Value Date/Time  Color YELLOW/STRAW 08/21/2018 07:30 PM  
 Appearance CLEAR 08/21/2018 07:30 PM  
 Specific gravity 1.015 08/21/2018 07:30 PM  
 pH (UA) 6.5 08/21/2018 07:30 PM  
 Protein NEGATIVE  08/21/2018 07:30 PM  
 Glucose NEGATIVE  08/21/2018 07:30 PM  
 Ketone NEGATIVE  08/21/2018 07:30 PM  
 Bilirubin NEGATIVE  08/21/2018 07:30 PM  
 Urobilinogen 0.2 08/21/2018 07:30 PM  
 Nitrites NEGATIVE  08/21/2018 07:30 PM  
 Leukocyte Esterase NEGATIVE  08/21/2018 07:30 PM  
 Epithelial cells FEW 08/21/2018 07:30 PM  
 Bacteria NEGATIVE  08/21/2018 07:30 PM  
 WBC 0-4 08/21/2018 07:30 PM  
 RBC 0-5 08/21/2018 07:30 PM  
 
 
 
Medications Reviewed:  
 
Current Facility-Administered Medications Medication Dose Route Frequency  dexamethasone (DECADRON) tablet 4 mg  4 mg Oral Q12H  
 influenza vaccine 2018-19 (6 mos+)(PF) (FLUARIX QUAD/FLULAVAL QUAD) injection 0.5 mL  0.5 mL IntraMUSCular PRIOR TO DISCHARGE  lisinopril (PRINIVIL, ZESTRIL) tablet 10 mg  10 mg Oral DAILY  cloNIDine HCl (CATAPRES) tablet 0.1 mg  0.1 mg Oral Q4H PRN  
 sodium chloride (NS) flush 5-10 mL  5-10 mL IntraVENous Q8H  
 sodium chloride (NS) flush 5-10 mL  5-10 mL IntraVENous PRN  
 benztropine (COGENTIN) tablet 1 mg  1 mg Oral BID  divalproex DR (DEPAKOTE) tablet 500 mg  500 mg Oral QHS  divalproex ER (DEPAKOTE ER) 24 hour tablet 250 mg  250 mg Oral DAILY  docusate sodium (COLACE) capsule 100 mg  100 mg Oral BID  
 haloperidol (HALDOL) tablet 5 mg  5 mg Oral BID  insulin regular (NOVOLIN R, HUMULIN R) injection   SubCUTAneous AC&HS  
 glucose chewable tablet 16 g  4 Tab Oral PRN  
 dextrose (D50W) injection syrg 12.5-25 g  12.5-25 g IntraVENous PRN  
 glucagon (GLUCAGEN) injection 1 mg  1 mg IntraMUSCular PRN  pantoprazole (PROTONIX) tablet 40 mg  40 mg Oral ACB  levETIRAcetam (KEPPRA) tablet 1,000 mg  1,000 mg Oral BID  
 
______________________________________________________________________ EXPECTED LENGTH OF STAY: 4d 12h ACTUAL LENGTH OF STAY:          9 Darshana Horan MD

## 2018-12-04 LAB
GLUCOSE BLD STRIP.AUTO-MCNC: 112 MG/DL (ref 65–100)
GLUCOSE BLD STRIP.AUTO-MCNC: 144 MG/DL (ref 65–100)
GLUCOSE BLD STRIP.AUTO-MCNC: 145 MG/DL (ref 65–100)
GLUCOSE BLD STRIP.AUTO-MCNC: 92 MG/DL (ref 65–100)
SERVICE CMNT-IMP: ABNORMAL
SERVICE CMNT-IMP: NORMAL

## 2018-12-04 PROCEDURE — 74011250636 HC RX REV CODE- 250/636: Performed by: INTERNAL MEDICINE

## 2018-12-04 PROCEDURE — 74011250637 HC RX REV CODE- 250/637: Performed by: HOSPITALIST

## 2018-12-04 PROCEDURE — 74011636637 HC RX REV CODE- 636/637: Performed by: HOSPITALIST

## 2018-12-04 PROCEDURE — 74011250637 HC RX REV CODE- 250/637: Performed by: SPECIALIST

## 2018-12-04 PROCEDURE — 82962 GLUCOSE BLOOD TEST: CPT

## 2018-12-04 PROCEDURE — 65270000029 HC RM PRIVATE

## 2018-12-04 PROCEDURE — 74011250637 HC RX REV CODE- 250/637: Performed by: NURSE PRACTITIONER

## 2018-12-04 RX ADMIN — BENZTROPINE MESYLATE 1 MG: 1 TABLET ORAL at 17:25

## 2018-12-04 RX ADMIN — DIVALPROEX SODIUM 250 MG: 250 TABLET, EXTENDED RELEASE ORAL at 09:57

## 2018-12-04 RX ADMIN — BENZTROPINE MESYLATE 1 MG: 1 TABLET ORAL at 09:57

## 2018-12-04 RX ADMIN — DEXAMETHASONE 2 MG: 4 TABLET ORAL at 08:00

## 2018-12-04 RX ADMIN — HALOPERIDOL 5 MG: 5 TABLET ORAL at 17:26

## 2018-12-04 RX ADMIN — LEVETIRACETAM 1000 MG: 500 TABLET ORAL at 09:57

## 2018-12-04 RX ADMIN — DIVALPROEX SODIUM 500 MG: 500 TABLET, DELAYED RELEASE ORAL at 22:00

## 2018-12-04 RX ADMIN — HALOPERIDOL 5 MG: 5 TABLET ORAL at 09:58

## 2018-12-04 RX ADMIN — DEXAMETHASONE 2 MG: 4 TABLET ORAL at 17:25

## 2018-12-04 RX ADMIN — LISINOPRIL 10 MG: 10 TABLET ORAL at 09:57

## 2018-12-04 RX ADMIN — LEVETIRACETAM 1000 MG: 500 TABLET ORAL at 17:25

## 2018-12-04 RX ADMIN — HUMAN INSULIN 2 UNITS: 100 INJECTION, SOLUTION SUBCUTANEOUS at 12:24

## 2018-12-04 RX ADMIN — DOCUSATE SODIUM 100 MG: 100 CAPSULE, LIQUID FILLED ORAL at 17:26

## 2018-12-04 RX ADMIN — PANTOPRAZOLE SODIUM 40 MG: 40 TABLET, DELAYED RELEASE ORAL at 07:30

## 2018-12-04 RX ADMIN — DOCUSATE SODIUM 100 MG: 100 CAPSULE, LIQUID FILLED ORAL at 09:57

## 2018-12-04 NOTE — PROGRESS NOTES
[phone] need for level 2 screening. Message left to have call returned. 9:36am.  CM received return call from Bhakti Chakraborty [phone] ext [phone]. She will need UAI, dmas 95, H&P, Psychiatric progress notes. Agency fax number is [phone]. Upon receipt of above documents, agency will determine if LEVEL 2 is warranted. CM met with patient as he asked why he couldn't go home. CM explained to patient that [facility] was considering him. He was reminded of his behavior (being verbally abusive to staff member who visited him on Friday). CM called [employer] to inquire about a male vacancy. No vacancy at this time.    Javier Licona, BSW, CRM

## 2018-12-04 NOTE — PROGRESS NOTES
Problem: Falls - Risk of 
Goal: *Absence of Falls Document Renuka Aiken Fall Risk and appropriate interventions in the flowsheet. Outcome: Progressing Towards Goal 
Fall Risk Interventions: 
Mobility Interventions: Patient to call before getting OOB, Communicate number of staff needed for ambulation/transfer Mentation Interventions: Adequate sleep, hydration, pain control, Door open when patient unattended Medication Interventions: Evaluate medications/consider consulting pharmacy, Patient to call before getting OOB Elimination Interventions: Call light in reach, Patient to call for help with toileting needs History of Falls Interventions: Bed/chair exit alarm

## 2018-12-05 LAB
GLUCOSE BLD STRIP.AUTO-MCNC: 102 MG/DL (ref 65–100)
GLUCOSE BLD STRIP.AUTO-MCNC: 114 MG/DL (ref 65–100)
GLUCOSE BLD STRIP.AUTO-MCNC: 147 MG/DL (ref 65–100)
GLUCOSE BLD STRIP.AUTO-MCNC: 149 MG/DL (ref 65–100)
SERVICE CMNT-IMP: ABNORMAL

## 2018-12-05 PROCEDURE — 74011250637 HC RX REV CODE- 250/637: Performed by: SPECIALIST

## 2018-12-05 PROCEDURE — 82962 GLUCOSE BLOOD TEST: CPT

## 2018-12-05 PROCEDURE — 65270000029 HC RM PRIVATE

## 2018-12-05 PROCEDURE — 74011250637 HC RX REV CODE- 250/637: Performed by: NURSE PRACTITIONER

## 2018-12-05 PROCEDURE — 74011250636 HC RX REV CODE- 250/636: Performed by: INTERNAL MEDICINE

## 2018-12-05 PROCEDURE — 74011636637 HC RX REV CODE- 636/637: Performed by: HOSPITALIST

## 2018-12-05 PROCEDURE — 74011250637 HC RX REV CODE- 250/637: Performed by: HOSPITALIST

## 2018-12-05 RX ADMIN — LEVETIRACETAM 1000 MG: 500 TABLET ORAL at 10:08

## 2018-12-05 RX ADMIN — BENZTROPINE MESYLATE 1 MG: 1 TABLET ORAL at 17:36

## 2018-12-05 RX ADMIN — HUMAN INSULIN 2 UNITS: 100 INJECTION, SOLUTION SUBCUTANEOUS at 12:05

## 2018-12-05 RX ADMIN — DIVALPROEX SODIUM 500 MG: 500 TABLET, DELAYED RELEASE ORAL at 22:01

## 2018-12-05 RX ADMIN — DEXAMETHASONE 2 MG: 4 TABLET ORAL at 08:00

## 2018-12-05 RX ADMIN — HALOPERIDOL 5 MG: 5 TABLET ORAL at 17:36

## 2018-12-05 RX ADMIN — DIVALPROEX SODIUM 250 MG: 250 TABLET, EXTENDED RELEASE ORAL at 10:09

## 2018-12-05 RX ADMIN — HALOPERIDOL 5 MG: 5 TABLET ORAL at 10:10

## 2018-12-05 RX ADMIN — LEVETIRACETAM 1000 MG: 500 TABLET ORAL at 17:36

## 2018-12-05 RX ADMIN — DOCUSATE SODIUM 100 MG: 100 CAPSULE, LIQUID FILLED ORAL at 10:09

## 2018-12-05 RX ADMIN — PANTOPRAZOLE SODIUM 40 MG: 40 TABLET, DELAYED RELEASE ORAL at 07:30

## 2018-12-05 RX ADMIN — DOCUSATE SODIUM 100 MG: 100 CAPSULE, LIQUID FILLED ORAL at 17:36

## 2018-12-05 RX ADMIN — LISINOPRIL 10 MG: 10 TABLET ORAL at 10:09

## 2018-12-05 RX ADMIN — DEXAMETHASONE 2 MG: 4 TABLET ORAL at 17:36

## 2018-12-05 RX ADMIN — BENZTROPINE MESYLATE 1 MG: 1 TABLET ORAL at 10:09

## 2018-12-05 NOTE — PROGRESS NOTES
NUTRITION COMPLETE ASSESSMENT 
 
RECOMMENDATIONS:  
1. Continue current diet 2. Check A1c% 3. RD added ONS with meals 4. Daily weights Interventions/Plan:  
Food/Nutrient Delivery:    Commercial supplement       Ensure Compact with meals (220cals & 9g Pro each), Boost Fortified Pudding BID Nutrition Education:   RD Recommends Ensure High Protein or Glucerna for home use Coordination of Care:  attended rounds Assessment:  
Reason for Assessment:  
[x] Provider Consult [x]BPA/MST Referral  
[]LOS []Reassessment  
[]NPO/Clear Liquid []At Nutrition Risk []Other Diet: Regular Supplements: n/a Nutritionally Significant Medications: [x] Reviewed & Includes: decadron, depakote, colace, haldol, SSI, Protonix, keppra Meal Intake:  
Patient Vitals for the past 100 hrs: 
 % Diet Eaten 12/03/18 0931 100 % 12/02/18 1750 90 % 12/02/18 1341 80 % 12/02/18 1012 100 % 12/01/18 1803 80 % 12/01/18 1305 90 % 12/01/18 0855 100 % Pre-Hospitalization: 
Usual Appetite: Excellent Diet at Home: Regular Current Hospitalization:  
Fluid Restriction:   
Appetite: Excellent PO Ability: With assist Average po intake:% Average supplements intake:  n/a Subjective: 
Pt sitting up in chair with caregivers in room with him from [facility]. Pt states he's eating well and likes the food. Pt is looking forward to getting home to see his friends. Voices he likes coffee. Objective: 
Pt admitted for weakness & slurred speech, resides at [facility]. PMHx: schizophrenia. Medications include metformin without mention of history of DM. Pt with some elevation in BG but also getting Decadron BID. Checking an A1c% may help determine if continued need for metformin at discharge or if this can be eliminated or reduced given the diagnosis. Pt with poor denture but reports he can eat anything without any issues. Not enough wt history available to determine significant wt loss.  Pt with low BMI will benefit from ONS in addition to eating meals to help maintain weight and nutrients. This should continue beyond these hospital days at least 30 days and likely indefinitely. Estimated Nutrition Needs:  
Kcals/day: 1900 Kcals/day Protein: 82 g(1.5 g/kg of current wt) Fluid: 1900 ml(35 mL/kg of current wt) Based On: Kcal/kg - specify (Comment)(~35 kcal/kg of current wt) Weight Used: Actual wt(54.6 kg) Pt expected to meet estimated nutrient needs:  [x]   Yes     []  No [] Unable to predict at this time Nutrition Diagnosis:  
1. Increased nutrient needs related to total calories  as evidenced by brain mass and low BMI Goals:   
 tolerance of 75% of ONS and meals within 1 week; Stable weight +/- 2kg during admission Monitoring & Evaluation: - Total energy intake - Weight/weight change -   
 
Previous Nutrition Goals Met:   N/A Previous Recommendations:    N/A Education & Discharge Needs: 
 [] None Identified 
 [x] Identified and addressed: daily use of added nutritional supplement to increase calories [x] Participated in care plan, discharge planning, and/or interdisciplinary rounds Cultural, Catholic and ethnic food preferences identified: None Skin Integrity: [x]Intact  []Other Edema: [x]None []Other Last BM: 12/1 Food Allergies: [x]None []Other Diet Restrictions: Cultural/Hinduism Preference(s): None Anthropometrics:   
Weight Loss Metrics 11/28/2018 8/21/2018 9/27/2016 1/8/2015 1/19/2011 Today's Wt 120 lb 5.9 oz 133 lb 172 lb 160 lb 160 lb BMI 18.3 kg/m2 20.22 kg/m2 26.15 kg/m2 24.33 kg/m2 24.33 kg/m2 Weight Source: Bed Height: 5' 8\" (172.7 cm), Body mass index is 18.3 kg/m². IBW : 69.9 kg (154 lb), Usual Body Weight: 56.7 kg (125 lb),   
 
Labs:   
Lab Results Component Value Date/Time  Sodium 138 11/24/2018 06:00 PM  
 Potassium 3.6 11/24/2018 06:00 PM  
 Chloride 102 11/24/2018 06:00 PM  
 CO2 28 11/24/2018 06:00 PM  
 Glucose 119 (H) 11/24/2018 06:00 PM  
 BUN 15 11/24/2018 06:00 PM  
 Creatinine 0.99 11/24/2018 06:00 PM  
 Calcium 8.6 11/24/2018 06:00 PM  
 Albumin 3.6 11/24/2018 06:00 PM  
 
No results found for: HBA1C, HGBE8, WND2ISTQ, AXF5DWSO Amy Gonzalez RD, MS, CDE Pager #[phone] or [phone]

## 2018-12-05 NOTE — PROGRESS NOTES
Hospitalist Progress Note Zara Callahan DO Answering service: [phone] OR [phone] from in house phone Date of Service:  2018 NAME:  Barbara Donald :  1965 MRN:  [id number] Admission Summary:  
67-year-old gentleman who has history of schizophrenia, currently resides at [facility], was sent as he has been seen by the staff with slurred speech and left-sided weakness off and on. 
2018 he had a CAT scan of the head done which showed a mass suggestive of high-grade glioma. Interval history / Subjective:  
Pt seen and examined in bedside, eager to leave hospital  
 
Case management working on placement Assessment & Plan:  
 
Brain mass, suggestive of high-grade Glioma Repeat CT on admission showed significant interval increase in the size of large right temporoparietal heterogenous mass with solid and cystic component suspicious for high-grade lymphoma Neuro surgery consulted, its large cystic mass, not operable per NS Decadron,Keppra & mannitol- palliative following: -  Family agreed on discharge on Hospice. Will gradually taper down decadron. Schizophrenia- Haldol, depakot Poor insight and judgement- Refusing tx, wants to leave AMA -TDO obtained  DM SSI 
HLD Cont statin Code status:Full DVT prophylaxis: SCD Disposition: Hospice Hospital Problems  Date Reviewed: 2016 Codes Class Noted POA * (Principal) Brain mass ICD-10-CM: G93.9 ICD-9-CM: 348.9  2018 Yes Review of Systems: A comprehensive review of systems was negative except for that written in the HPI. Vital Signs:  
 Last 24hrs VS reviewed since prior progress note. Most recent are: 
Visit Vitals /84 (BP 1 Location: Right arm, BP Patient Position: Sitting) Pulse 67 Temp 98.5 °F (36.9 °C) Resp 18 Ht 5' 8\" (1.727 m) Wt 54.6 kg (120 lb 5.9 oz) SpO2 100% BMI 18.30 kg/m² No intake or output data in the 24 hours ending 12/04/18 1933 Physical Examination:  
 
 
     
Constitutional:  No acute distress, pleasant Resp:  CTA bilaterally. No wheezing/rhonchi/rales. No accessory muscle use CV:  Regular rhythm, normal rate, no murmurs, GI:  Soft, non distended, non tender. normoactive bowel sounds Musculoskeletal:  No edema, warm Neurologic:  Moves all extremities. AAOx2 Psych:  poor insight and jugdement Data Review:  
 Review and/or order of clinical lab test 
 
 
Labs:  
 
No results for input(s): WBC, HGB, HCT, PLT, HGBEXT, HCTEXT, PLTEXT, HGBEXT, HCTEXT, PLTEXT in the last 72 hours. No results for input(s): NA, K, CL, CO2, BUN, CREA, GLU, CA, MG, PHOS, URICA in the last 72 hours. No results for input(s): SGOT, GPT, ALT, AP, TBIL, TBILI, TP, ALB, GLOB, GGT, AML, LPSE in the last 72 hours. No lab exists for component: AMYP, HLPSE No results for input(s): INR, PTP, APTT in the last 72 hours. No lab exists for component: INREXT, INREXT No results for input(s): FE, TIBC, PSAT, FERR in the last 72 hours. No results found for: FOL, RBCF No results for input(s): PH, PCO2, PO2 in the last 72 hours. No results for input(s): CPK, CKNDX, TROIQ in the last 72 hours. No lab exists for component: CPKMB No results found for: CHOL, CHOLX, CHLST, CHOLV, HDL, LDL, LDLC, DLDLP, TGLX, TRIGL, TRIGP, CHHD, CHHDX Lab Results Component Value Date/Time Glucose (POC) 112 (H) 12/04/2018 04:57 PM  
 Glucose (POC) 144 (H) 12/04/2018 11:24 AM  
 Glucose (POC) 92 12/04/2018 06:43 AM  
 Glucose (POC) 144 (H) 12/03/2018 09:04 PM  
 Glucose (POC) 170 (H) 12/03/2018 05:07 PM  
 
Lab Results Component Value Date/Time  Color YELLOW/STRAW 08/21/2018 07:30 PM  
 Appearance CLEAR 08/21/2018 07:30 PM  
 Specific gravity 1.015 08/21/2018 07:30 PM  
 pH (UA) 6.5 08/21/2018 07:30 PM  
 Protein NEGATIVE  08/21/2018 07:30 PM  
 Glucose NEGATIVE  08/21/2018 07:30 PM  
 Ketone NEGATIVE  08/21/2018 07:30 PM  
 Bilirubin NEGATIVE  08/21/2018 07:30 PM  
 Urobilinogen 0.2 08/21/2018 07:30 PM  
 Nitrites NEGATIVE  08/21/2018 07:30 PM  
 Leukocyte Esterase NEGATIVE  08/21/2018 07:30 PM  
 Epithelial cells FEW 08/21/2018 07:30 PM  
 Bacteria NEGATIVE  08/21/2018 07:30 PM  
 WBC 0-4 08/21/2018 07:30 PM  
 RBC 0-5 08/21/2018 07:30 PM  
 
 
 
Medications Reviewed:  
 
Current Facility-Administered Medications Medication Dose Route Frequency  dexamethasone (DECADRON) tablet 2 mg  2 mg Oral BID WITH MEALS  influenza vaccine 2018-19 (6 mos+)(PF) (FLUARIX QUAD/FLULAVAL QUAD) injection 0.5 mL  0.5 mL IntraMUSCular PRIOR TO DISCHARGE  lisinopril (PRINIVIL, ZESTRIL) tablet 10 mg  10 mg Oral DAILY  cloNIDine HCl (CATAPRES) tablet 0.1 mg  0.1 mg Oral Q4H PRN  
 sodium chloride (NS) flush 5-10 mL  5-10 mL IntraVENous Q8H  
 sodium chloride (NS) flush 5-10 mL  5-10 mL IntraVENous PRN  
 benztropine (COGENTIN) tablet 1 mg  1 mg Oral BID  divalproex DR (DEPAKOTE) tablet 500 mg  500 mg Oral QHS  divalproex ER (DEPAKOTE ER) 24 hour tablet 250 mg  250 mg Oral DAILY  docusate sodium (COLACE) capsule 100 mg  100 mg Oral BID  
 haloperidol (HALDOL) tablet 5 mg  5 mg Oral BID  insulin regular (NOVOLIN R, HUMULIN R) injection   SubCUTAneous AC&HS  
 glucose chewable tablet 16 g  4 Tab Oral PRN  
 dextrose (D50W) injection syrg 12.5-25 g  12.5-25 g IntraVENous PRN  
 glucagon (GLUCAGEN) injection 1 mg  1 mg IntraMUSCular PRN  pantoprazole (PROTONIX) tablet 40 mg  40 mg Oral ACB  levETIRAcetam (KEPPRA) tablet 1,000 mg  1,000 mg Oral BID  
 
______________________________________________________________________ EXPECTED LENGTH OF STAY: 4d 12h ACTUAL LENGTH OF STAY:          10 Barry Bloom DO

## 2018-12-05 NOTE — PROGRESS NOTES
Problem: Falls - Risk of 
Goal: *Absence of Falls Document Alecia RomanAria Fall Risk and appropriate interventions in the flowsheet. Outcome: Progressing Towards Goal 
Fall Risk Interventions: 
Mobility Interventions: Patient to call before getting OOB Mentation Interventions: Adequate sleep, hydration, pain control, Door open when patient unattended Medication Interventions: Evaluate medications/consider consulting pharmacy Elimination Interventions: Call light in reach History of Falls Interventions: Bed/chair exit alarm

## 2018-12-05 NOTE — PROGRESS NOTES
Hospitalist Progress Note Kranthi Guadalupe DO Answering service: [phone] OR [phone] from in house phone Date of Service:  2018 NAME:  Joana Rodas :  1965 MRN:  [id number] Admission Summary:  
15-year-old gentleman who has history of schizophrenia, currently resides at [facility], was sent as he has been seen by the staff with slurred speech and left-sided weakness off and on. 
2018 he had a CAT scan of the head done which showed a mass suggestive of high-grade glioma. Interval history / Subjective:  
Pt seen and examined in bedside, eager to leave hospital  
 
Case management working on placement Assessment & Plan:  
 
Brain mass, suggestive of high-grade Glioma Repeat CT on admission showed significant interval increase in the size of large right temporoparietal heterogenous mass with solid and cystic component suspicious for high-grade lymphoma Neuro surgery consulted, its large cystic mass, not operable per NS Decadron,Keppra & mannitol- palliative following: -  Family agreed on discharge on Hospice. Will gradually taper down decadron. Schizophrenia- Haldol, depakot Poor insight and judgement- Refusing tx, wants to leave AMA -TDO obtained  DM SSI 
HLD Cont statin Code status:Full DVT prophylaxis: SCD Disposition: Hospice Hospital Problems  Date Reviewed: 2016 Codes Class Noted POA * (Principal) Brain mass ICD-10-CM: G93.9 ICD-9-CM: 348.9  2018 Yes Review of Systems: A comprehensive review of systems was negative except for that written in the HPI. Vital Signs:  
 Last 24hrs VS reviewed since prior progress note. Most recent are: 
Visit Vitals /73 (BP 1 Location: Right arm, BP Patient Position: At rest;Sitting) Pulse 71 Temp 98.5 °F (36.9 °C) Resp 18 Ht 5' 8\" (1.727 m) Wt 54.6 kg (120 lb 5.9 oz) SpO2 99% BMI 18.30 kg/m² Intake/Output Summary (Last 24 hours) at 12/5/2018 1738 Last data filed at 12/5/2018 3939 Gross per 24 hour Intake 1680 ml Output  Net 1680 ml Physical Examination:  
 
 
     
Constitutional:  No acute distress, pleasant Resp:  CTA bilaterally. No wheezing/rhonchi/rales. No accessory muscle use CV:  Regular rhythm, normal rate, no murmurs, GI:  Soft, non distended, non tender. normoactive bowel sounds Musculoskeletal:  No edema, warm Neurologic:  Moves all extremities. AAOx2 Psych:  poor insight and jugdement Data Review:  
 Review and/or order of clinical lab test 
 
 
Labs:  
 
No results for input(s): WBC, HGB, HCT, PLT, HGBEXT, HCTEXT, PLTEXT, HGBEXT, HCTEXT, PLTEXT in the last 72 hours. No results for input(s): NA, K, CL, CO2, BUN, CREA, GLU, CA, MG, PHOS, URICA in the last 72 hours. No results for input(s): SGOT, GPT, ALT, AP, TBIL, TBILI, TP, ALB, GLOB, GGT, AML, LPSE in the last 72 hours. No lab exists for component: AMYP, HLPSE No results for input(s): INR, PTP, APTT in the last 72 hours. No lab exists for component: INREXT, INREXT No results for input(s): FE, TIBC, PSAT, FERR in the last 72 hours. No results found for: FOL, RBCF No results for input(s): PH, PCO2, PO2 in the last 72 hours. No results for input(s): CPK, CKNDX, TROIQ in the last 72 hours. No lab exists for component: CPKMB No results found for: CHOL, CHOLX, CHLST, CHOLV, HDL, LDL, LDLC, DLDLP, TGLX, TRIGL, TRIGP, CHHD, CHHDX Lab Results Component Value Date/Time Glucose (POC) 114 (H) 12/05/2018 04:13 PM  
 Glucose (POC) 149 (H) 12/05/2018 10:56 AM  
 Glucose (POC) 102 (H) 12/05/2018 06:25 AM  
 Glucose (POC) 145 (H) 12/04/2018 09:48 PM  
 Glucose (POC) 112 (H) 12/04/2018 04:57 PM  
 
Lab Results Component Value Date/Time  Color YELLOW/STRAW 08/21/2018 07:30 PM  
 Appearance CLEAR 08/21/2018 07:30 PM  
 Specific gravity 1.015 08/21/2018 07:30 PM  
 pH (UA) 6.5 08/21/2018 07:30 PM  
 Protein NEGATIVE  08/21/2018 07:30 PM  
 Glucose NEGATIVE  08/21/2018 07:30 PM  
 Ketone NEGATIVE  08/21/2018 07:30 PM  
 Bilirubin NEGATIVE  08/21/2018 07:30 PM  
 Urobilinogen 0.2 08/21/2018 07:30 PM  
 Nitrites NEGATIVE  08/21/2018 07:30 PM  
 Leukocyte Esterase NEGATIVE  08/21/2018 07:30 PM  
 Epithelial cells FEW 08/21/2018 07:30 PM  
 Bacteria NEGATIVE  08/21/2018 07:30 PM  
 WBC 0-4 08/21/2018 07:30 PM  
 RBC 0-5 08/21/2018 07:30 PM  
 
 
 
Medications Reviewed:  
 
Current Facility-Administered Medications Medication Dose Route Frequency  dexamethasone (DECADRON) tablet 2 mg  2 mg Oral BID WITH MEALS  influenza vaccine 2018-19 (6 mos+)(PF) (FLUARIX QUAD/FLULAVAL QUAD) injection 0.5 mL  0.5 mL IntraMUSCular PRIOR TO DISCHARGE  lisinopril (PRINIVIL, ZESTRIL) tablet 10 mg  10 mg Oral DAILY  cloNIDine HCl (CATAPRES) tablet 0.1 mg  0.1 mg Oral Q4H PRN  
 sodium chloride (NS) flush 5-10 mL  5-10 mL IntraVENous Q8H  
 sodium chloride (NS) flush 5-10 mL  5-10 mL IntraVENous PRN  
 benztropine (COGENTIN) tablet 1 mg  1 mg Oral BID  divalproex DR (DEPAKOTE) tablet 500 mg  500 mg Oral QHS  divalproex ER (DEPAKOTE ER) 24 hour tablet 250 mg  250 mg Oral DAILY  docusate sodium (COLACE) capsule 100 mg  100 mg Oral BID  
 haloperidol (HALDOL) tablet 5 mg  5 mg Oral BID  insulin regular (NOVOLIN R, HUMULIN R) injection   SubCUTAneous AC&HS  
 glucose chewable tablet 16 g  4 Tab Oral PRN  
 dextrose (D50W) injection syrg 12.5-25 g  12.5-25 g IntraVENous PRN  
 glucagon (GLUCAGEN) injection 1 mg  1 mg IntraMUSCular PRN  pantoprazole (PROTONIX) tablet 40 mg  40 mg Oral ACB  levETIRAcetam (KEPPRA) tablet 1,000 mg  1,000 mg Oral BID  
 
______________________________________________________________________ EXPECTED LENGTH OF STAY: 4d 12h ACTUAL LENGTH OF STAY:          11 [address] [facility],

## 2018-12-06 LAB
GLUCOSE BLD STRIP.AUTO-MCNC: 126 MG/DL (ref 65–100)
GLUCOSE BLD STRIP.AUTO-MCNC: 140 MG/DL (ref 65–100)
GLUCOSE BLD STRIP.AUTO-MCNC: 86 MG/DL (ref 65–100)
GLUCOSE BLD STRIP.AUTO-MCNC: 97 MG/DL (ref 65–100)
SERVICE CMNT-IMP: ABNORMAL
SERVICE CMNT-IMP: ABNORMAL
SERVICE CMNT-IMP: NORMAL
SERVICE CMNT-IMP: NORMAL

## 2018-12-06 PROCEDURE — 74011250637 HC RX REV CODE- 250/637: Performed by: HOSPITALIST

## 2018-12-06 PROCEDURE — 74011250636 HC RX REV CODE- 250/636: Performed by: INTERNAL MEDICINE

## 2018-12-06 PROCEDURE — 74011250637 HC RX REV CODE- 250/637: Performed by: SPECIALIST

## 2018-12-06 PROCEDURE — 82962 GLUCOSE BLOOD TEST: CPT

## 2018-12-06 PROCEDURE — 74011250637 HC RX REV CODE- 250/637: Performed by: NURSE PRACTITIONER

## 2018-12-06 PROCEDURE — 65270000029 HC RM PRIVATE

## 2018-12-06 RX ADMIN — DEXAMETHASONE 2 MG: 4 TABLET ORAL at 18:27

## 2018-12-06 RX ADMIN — LEVETIRACETAM 1000 MG: 500 TABLET ORAL at 09:40

## 2018-12-06 RX ADMIN — DOCUSATE SODIUM 100 MG: 100 CAPSULE, LIQUID FILLED ORAL at 18:27

## 2018-12-06 RX ADMIN — DIVALPROEX SODIUM 250 MG: 250 TABLET, EXTENDED RELEASE ORAL at 09:40

## 2018-12-06 RX ADMIN — LEVETIRACETAM 1000 MG: 500 TABLET ORAL at 18:27

## 2018-12-06 RX ADMIN — DOCUSATE SODIUM 100 MG: 100 CAPSULE, LIQUID FILLED ORAL at 09:40

## 2018-12-06 RX ADMIN — DIVALPROEX SODIUM 500 MG: 500 TABLET, DELAYED RELEASE ORAL at 22:14

## 2018-12-06 RX ADMIN — BENZTROPINE MESYLATE 1 MG: 1 TABLET ORAL at 09:40

## 2018-12-06 RX ADMIN — HALOPERIDOL 5 MG: 5 TABLET ORAL at 09:40

## 2018-12-06 RX ADMIN — DEXAMETHASONE 2 MG: 4 TABLET ORAL at 07:02

## 2018-12-06 RX ADMIN — LISINOPRIL 10 MG: 10 TABLET ORAL at 09:40

## 2018-12-06 RX ADMIN — PANTOPRAZOLE SODIUM 40 MG: 40 TABLET, DELAYED RELEASE ORAL at 07:02

## 2018-12-06 RX ADMIN — HALOPERIDOL 5 MG: 5 TABLET ORAL at 18:27

## 2018-12-06 RX ADMIN — BENZTROPINE MESYLATE 1 MG: 1 TABLET ORAL at 18:28

## 2018-12-06 NOTE — PROGRESS NOTES
Hospitalist Progress Note Jace Cabrera DO Answering service: [phone] OR [phone] from in house phone Date of Service:  2018 NAME:  Nayeli Cevallos :  1965 MRN:  [id number] Admission Summary:  
67-year-old gentleman who has history of schizophrenia, currently resides at [facility], was sent as he has been seen by the staff with slurred speech and left-sided weakness off and on. 
2018 he had a CAT scan of the head done which showed a mass suggestive of high-grade glioma. Interval history / Subjective:  
Pt seen and examined. No complaints. Requests to leave hospital.  
 
Case management working on placement Assessment & Plan:  
 
Brain mass, suggestive of high-grade Glioma Repeat CT on admission showed significant interval increase in the size of large right temporoparietal heterogenous mass with solid and cystic component suspicious for high-grade lymphoma Neuro surgery consulted, its large cystic mass, not operable per NS Decadron,Keppra & mannitol- palliative following: -  Family agreed on discharge on Hospice. Will gradually taper down decadron. Schizophrenia- Haldol, depakot Poor insight and judgement- Refusing tx, wants to leave AMA -TDO obtained  DM SSI 
HLD Cont statin Code status:Full DVT prophylaxis: SCD Disposition: Hospice Hospital Problems  Date Reviewed: 2016 Codes Class Noted POA * (Principal) Brain mass ICD-10-CM: G93.9 ICD-9-CM: 348.9  2018 Yes Review of Systems: A comprehensive review of systems was negative except for that written in the HPI. Vital Signs:  
 Last 24hrs VS reviewed since prior progress note. Most recent are: 
Visit Vitals /87 (BP 1 Location: Left arm, BP Patient Position: At rest) Pulse 81 Temp 98.4 °F (36.9 °C) Resp 18 Ht 5' 8\" (1.727 m) Wt 54.6 kg (120 lb 5.9 oz) SpO2 100% BMI 18.30 kg/m² No intake or output data in the 24 hours ending 12/06/18 1204 Physical Examination:  
 
 
     
Constitutional:  No acute distress, pleasant Resp:  CTA bilaterally. No wheezing/rhonchi/rales. No accessory muscle use CV:  Regular rhythm, normal rate, no murmurs, GI:  Soft, non distended, non tender. normoactive bowel sounds Musculoskeletal:  No edema, warm Neurologic:  Moves all extremities. AAOx2 Psych:  poor insight and jugdement Data Review:  
 Review and/or order of clinical lab test 
 
 
Labs:  
 
No results for input(s): WBC, HGB, HCT, PLT, HGBEXT, HCTEXT, PLTEXT, HGBEXT, HCTEXT, PLTEXT in the last 72 hours. No results for input(s): NA, K, CL, CO2, BUN, CREA, GLU, CA, MG, PHOS, URICA in the last 72 hours. No results for input(s): SGOT, GPT, ALT, AP, TBIL, TBILI, TP, ALB, GLOB, GGT, AML, LPSE in the last 72 hours. No lab exists for component: AMYP, HLPSE No results for input(s): INR, PTP, APTT in the last 72 hours. No lab exists for component: INREXT, INREXT No results for input(s): FE, TIBC, PSAT, FERR in the last 72 hours. No results found for: FOL, RBCF No results for input(s): PH, PCO2, PO2 in the last 72 hours. No results for input(s): CPK, CKNDX, TROIQ in the last 72 hours. No lab exists for component: CPKMB No results found for: CHOL, CHOLX, CHLST, CHOLV, HDL, LDL, LDLC, DLDLP, TGLX, TRIGL, TRIGP, CHHD, CHHDX Lab Results Component Value Date/Time Glucose (POC) 86 12/06/2018 11:11 AM  
 Glucose (POC) 97 12/06/2018 06:54 AM  
 Glucose (POC) 147 (H) 12/05/2018 09:23 PM  
 Glucose (POC) 114 (H) 12/05/2018 04:13 PM  
 Glucose (POC) 149 (H) 12/05/2018 10:56 AM  
 
Lab Results Component Value Date/Time  Color YELLOW/STRAW 08/21/2018 07:30 PM  
 Appearance CLEAR 08/21/2018 07:30 PM  
 Specific gravity 1.015 08/21/2018 07:30 PM  
 pH (UA) 6.5 08/21/2018 07:30 PM  
 Protein NEGATIVE  08/21/2018 07:30 PM  
 Glucose NEGATIVE  08/21/2018 07:30 PM  
 Ketone NEGATIVE  08/21/2018 07:30 PM  
 Bilirubin NEGATIVE  08/21/2018 07:30 PM  
 Urobilinogen 0.2 08/21/2018 07:30 PM  
 Nitrites NEGATIVE  08/21/2018 07:30 PM  
 Leukocyte Esterase NEGATIVE  08/21/2018 07:30 PM  
 Epithelial cells FEW 08/21/2018 07:30 PM  
 Bacteria NEGATIVE  08/21/2018 07:30 PM  
 WBC 0-4 08/21/2018 07:30 PM  
 RBC 0-5 08/21/2018 07:30 PM  
 
 
 
Medications Reviewed:  
 
Current Facility-Administered Medications Medication Dose Route Frequency  dexamethasone (DECADRON) tablet 2 mg  2 mg Oral BID WITH MEALS  influenza vaccine 2018-19 (6 mos+)(PF) (FLUARIX QUAD/FLULAVAL QUAD) injection 0.5 mL  0.5 mL IntraMUSCular PRIOR TO DISCHARGE  lisinopril (PRINIVIL, ZESTRIL) tablet 10 mg  10 mg Oral DAILY  cloNIDine HCl (CATAPRES) tablet 0.1 mg  0.1 mg Oral Q4H PRN  
 sodium chloride (NS) flush 5-10 mL  5-10 mL IntraVENous Q8H  
 sodium chloride (NS) flush 5-10 mL  5-10 mL IntraVENous PRN  
 benztropine (COGENTIN) tablet 1 mg  1 mg Oral BID  divalproex DR (DEPAKOTE) tablet 500 mg  500 mg Oral QHS  divalproex ER (DEPAKOTE ER) 24 hour tablet 250 mg  250 mg Oral DAILY  docusate sodium (COLACE) capsule 100 mg  100 mg Oral BID  
 haloperidol (HALDOL) tablet 5 mg  5 mg Oral BID  insulin regular (NOVOLIN R, HUMULIN R) injection   SubCUTAneous AC&HS  
 glucose chewable tablet 16 g  4 Tab Oral PRN  
 dextrose (D50W) injection syrg 12.5-25 g  12.5-25 g IntraVENous PRN  
 glucagon (GLUCAGEN) injection 1 mg  1 mg IntraMUSCular PRN  pantoprazole (PROTONIX) tablet 40 mg  40 mg Oral ACB  levETIRAcetam (KEPPRA) tablet 1,000 mg  1,000 mg Oral BID  
 
______________________________________________________________________ EXPECTED LENGTH OF STAY: 4d 12h ACTUAL LENGTH OF STAY:          12 [address] [facility],

## 2018-12-06 NOTE — PROGRESS NOTES
A Spiritual Care Partner Volunteer visited patient in Rm 616 on 12/6/2018. Documented by: 
Chaplain Riddle MDiv, MS, [place] [id number] PRAY ([id number])

## 2018-12-06 NOTE — PROGRESS NOTES
CM continuing to follow. Patient does not meet criteria for longterm care. He is independent in bathing, dressing, feeding, ambulation, toileting. He requires assistance in his IADL's. 
 
CM has contacted local facilities ([facility]) which house residents with mental illness. [facility]--no male vacancy; [facility]--several messages left to have call returned; [facility] spoke with Sharlene Perez ([phone]). [facility] faxed to agency at [phone]. CM called back to facility to confirm receipt. Agency will send over Dr. Nitza Mills to visit patient tomorrow. CM will inform patient of the visit.   Mague Hernandez, EVEW, CRM

## 2018-12-07 LAB
GLUCOSE BLD STRIP.AUTO-MCNC: 110 MG/DL (ref 65–100)
GLUCOSE BLD STRIP.AUTO-MCNC: 112 MG/DL (ref 65–100)
GLUCOSE BLD STRIP.AUTO-MCNC: 120 MG/DL (ref 65–100)
GLUCOSE BLD STRIP.AUTO-MCNC: 94 MG/DL (ref 65–100)
SERVICE CMNT-IMP: ABNORMAL
SERVICE CMNT-IMP: NORMAL

## 2018-12-07 PROCEDURE — 65270000029 HC RM PRIVATE

## 2018-12-07 PROCEDURE — 82962 GLUCOSE BLOOD TEST: CPT

## 2018-12-07 PROCEDURE — 74011250637 HC RX REV CODE- 250/637: Performed by: HOSPITALIST

## 2018-12-07 PROCEDURE — 74011250637 HC RX REV CODE- 250/637: Performed by: NURSE PRACTITIONER

## 2018-12-07 PROCEDURE — 74011250637 HC RX REV CODE- 250/637: Performed by: SPECIALIST

## 2018-12-07 PROCEDURE — 74011250636 HC RX REV CODE- 250/636: Performed by: INTERNAL MEDICINE

## 2018-12-07 RX ADMIN — PANTOPRAZOLE SODIUM 40 MG: 40 TABLET, DELAYED RELEASE ORAL at 07:47

## 2018-12-07 RX ADMIN — LISINOPRIL 10 MG: 10 TABLET ORAL at 08:32

## 2018-12-07 RX ADMIN — LEVETIRACETAM 1000 MG: 500 TABLET ORAL at 08:32

## 2018-12-07 RX ADMIN — BENZTROPINE MESYLATE 1 MG: 1 TABLET ORAL at 17:27

## 2018-12-07 RX ADMIN — DIVALPROEX SODIUM 250 MG: 250 TABLET, EXTENDED RELEASE ORAL at 08:32

## 2018-12-07 RX ADMIN — BENZTROPINE MESYLATE 1 MG: 1 TABLET ORAL at 08:32

## 2018-12-07 RX ADMIN — DEXAMETHASONE 2 MG: 4 TABLET ORAL at 07:47

## 2018-12-07 RX ADMIN — HALOPERIDOL 5 MG: 5 TABLET ORAL at 08:32

## 2018-12-07 RX ADMIN — DOCUSATE SODIUM 100 MG: 100 CAPSULE, LIQUID FILLED ORAL at 08:32

## 2018-12-07 RX ADMIN — HALOPERIDOL 5 MG: 5 TABLET ORAL at 17:27

## 2018-12-07 RX ADMIN — LEVETIRACETAM 1000 MG: 500 TABLET ORAL at 17:27

## 2018-12-07 RX ADMIN — DEXAMETHASONE 2 MG: 4 TABLET ORAL at 17:27

## 2018-12-07 RX ADMIN — DIVALPROEX SODIUM 500 MG: 500 TABLET, DELAYED RELEASE ORAL at 22:11

## 2018-12-07 RX ADMIN — DOCUSATE SODIUM 100 MG: 100 CAPSULE, LIQUID FILLED ORAL at 17:27

## 2018-12-07 NOTE — PROGRESS NOTES
Bedside and Verbal shift change report given to TUCKER Ybarra by Heidy Angel RN. Report included the following information SBAR, Kardex and MAR.

## 2018-12-07 NOTE — PROGRESS NOTES
Hospitalist Progress Note [address] [facility], DO Answering service: [phone] OR [phone] from in house phone Date of Service:  2018 NAME:  Grecia Smith :  1965 MRN:  [id number] Admission Summary:  
20-year-old gentleman who has history of schizophrenia, currently resides at [facility], was sent as he has been seen by the staff with slurred speech and left-sided weakness off and on. 
2018 he had a CAT scan of the head done which showed a mass suggestive of high-grade glioma. Interval history / Subjective:  
Pt seen and examined. No complaints. Requesting soda. Case management working on placement Assessment & Plan:  
 
Brain mass, suggestive of high-grade Glioma Repeat CT on admission showed significant interval increase in the size of large right temporoparietal heterogenous mass with solid and cystic component suspicious for high-grade lymphoma Neuro surgery consulted, its large cystic mass, not operable per NS Decadron,Keppra & mannitol- palliative following: -  Family agreed on discharge on Hospice. Continue decadron. Schizophrenia- Haldol, depakot Poor insight and judgement- Refusing tx, wants to leave AMA -TDO obtained  DM SSI 
HLD Cont statin Code status:Full DVT prophylaxis: SCD Disposition: Hospice Hospital Problems  Date Reviewed: 2016 Codes Class Noted POA * (Principal) Brain mass ICD-10-CM: G93.9 ICD-9-CM: 348.9  2018 Yes Review of Systems: A comprehensive review of systems was negative except for that written in the HPI. Vital Signs:  
 Last 24hrs VS reviewed since prior progress note. Most recent are: 
Visit Vitals /67 (BP 1 Location: Left arm, BP Patient Position: At rest) Pulse 80 Temp 98.5 °F (36.9 °C) Resp 17 Ht 5' 8\" (1.727 m) Wt 54.6 kg (120 lb 5.9 oz) SpO2 100% BMI 18.30 kg/m² Intake/Output Summary (Last 24 hours) at 12/7/2018 1639 Last data filed at 12/7/2018 9688 Gross per 24 hour Intake 420 ml Output  Net 420 ml Physical Examination:  
 
 
     
Constitutional:  No acute distress, pleasant Resp:  CTA bilaterally. No wheezing/rhonchi/rales. No accessory muscle use CV:  Regular rhythm, normal rate, no murmurs, GI:  Soft, non distended, non tender. normoactive bowel sounds Musculoskeletal:  No edema, warm Neurologic:  Moves all extremities. AAOx2 Psych:  poor insight and jugdement Data Review:  
 Review and/or order of clinical lab test 
 
 
Labs:  
 
No results for input(s): WBC, HGB, HCT, PLT, HGBEXT, HCTEXT, PLTEXT, HGBEXT, HCTEXT, PLTEXT in the last 72 hours. No results for input(s): NA, K, CL, CO2, BUN, CREA, GLU, CA, MG, PHOS, URICA in the last 72 hours. No results for input(s): SGOT, GPT, ALT, AP, TBIL, TBILI, TP, ALB, GLOB, GGT, AML, LPSE in the last 72 hours. No lab exists for component: AMYP, HLPSE No results for input(s): INR, PTP, APTT in the last 72 hours. No lab exists for component: INREXT, INREXT No results for input(s): FE, TIBC, PSAT, FERR in the last 72 hours. No results found for: FOL, RBCF No results for input(s): PH, PCO2, PO2 in the last 72 hours. No results for input(s): CPK, CKNDX, TROIQ in the last 72 hours. No lab exists for component: CPKMB No results found for: CHOL, CHOLX, CHLST, CHOLV, HDL, LDL, LDLC, DLDLP, TGLX, TRIGL, TRIGP, CHHD, CHHDX Lab Results Component Value Date/Time Glucose (POC) 94 12/07/2018 11:14 AM  
 Glucose (POC) 110 (H) 12/07/2018 05:30 AM  
 Glucose (POC) 126 (H) 12/06/2018 10:09 PM  
 Glucose (POC) 140 (H) 12/06/2018 04:18 PM  
 Glucose (POC) 86 12/06/2018 11:11 AM  
 
Lab Results Component Value Date/Time  Color YELLOW/STRAW 08/21/2018 07:30 PM  
 Appearance CLEAR 08/21/2018 07:30 PM  
 Specific gravity 1.015 08/21/2018 07:30 PM  
 pH (UA) 6.5 08/21/2018 07:30 PM  
 Protein NEGATIVE  08/21/2018 07:30 PM  
 Glucose NEGATIVE  08/21/2018 07:30 PM  
 Ketone NEGATIVE  08/21/2018 07:30 PM  
 Bilirubin NEGATIVE  08/21/2018 07:30 PM  
 Urobilinogen 0.2 08/21/2018 07:30 PM  
 Nitrites NEGATIVE  08/21/2018 07:30 PM  
 Leukocyte Esterase NEGATIVE  08/21/2018 07:30 PM  
 Epithelial cells FEW 08/21/2018 07:30 PM  
 Bacteria NEGATIVE  08/21/2018 07:30 PM  
 WBC 0-4 08/21/2018 07:30 PM  
 RBC 0-5 08/21/2018 07:30 PM  
 
 
 
Medications Reviewed:  
 
Current Facility-Administered Medications Medication Dose Route Frequency  dexamethasone (DECADRON) tablet 2 mg  2 mg Oral BID WITH MEALS  influenza vaccine 2018-19 (6 mos+)(PF) (FLUARIX QUAD/FLULAVAL QUAD) injection 0.5 mL  0.5 mL IntraMUSCular PRIOR TO DISCHARGE  lisinopril (PRINIVIL, ZESTRIL) tablet 10 mg  10 mg Oral DAILY  cloNIDine HCl (CATAPRES) tablet 0.1 mg  0.1 mg Oral Q4H PRN  
 sodium chloride (NS) flush 5-10 mL  5-10 mL IntraVENous PRN  
 benztropine (COGENTIN) tablet 1 mg  1 mg Oral BID  divalproex DR (DEPAKOTE) tablet 500 mg  500 mg Oral QHS  divalproex ER (DEPAKOTE ER) 24 hour tablet 250 mg  250 mg Oral DAILY  docusate sodium (COLACE) capsule 100 mg  100 mg Oral BID  
 haloperidol (HALDOL) tablet 5 mg  5 mg Oral BID  insulin regular (NOVOLIN R, HUMULIN R) injection   SubCUTAneous AC&HS  
 glucose chewable tablet 16 g  4 Tab Oral PRN  
 dextrose (D50W) injection syrg 12.5-25 g  12.5-25 g IntraVENous PRN  
 glucagon (GLUCAGEN) injection 1 mg  1 mg IntraMUSCular PRN  pantoprazole (PROTONIX) tablet 40 mg  40 mg Oral ACB  levETIRAcetam (KEPPRA) tablet 1,000 mg  1,000 mg Oral BID  
 
______________________________________________________________________ EXPECTED LENGTH OF STAY: 4d 12h ACTUAL LENGTH OF STAY:          13 [address] [facility],

## 2018-12-07 NOTE — PROGRESS NOTES
Bedside shift change report given to Laureano Arizmendi (oncoming nurse) by Jill Molina (offgoing nurse). Report included the following information SBAR shift updates.

## 2018-12-07 NOTE — PROGRESS NOTES
Follow-up call placed to [place] at [facility]. Dr. Ketan Francisco to visit patient today at [facility]  Patient informed of time for visit. IVIS Diaz, CRM 
 
1:29p  Dr. Ketan Francisco ([phone]) visited with patient. CM spoke with him by phone. He will return call to this CM later today. He voiced no concerns re his inability to meet the patient's needs in the facility. He will return call to this CM before 4pm today. CM spoke with patient. He state visit was good and he was told that he may go to facility ([facility]( [facility]) Monday or Tuesday.   IVIS Diaz, CRM

## 2018-12-08 LAB
GLUCOSE BLD STRIP.AUTO-MCNC: 102 MG/DL (ref 65–100)
GLUCOSE BLD STRIP.AUTO-MCNC: 119 MG/DL (ref 65–100)
GLUCOSE BLD STRIP.AUTO-MCNC: 132 MG/DL (ref 65–100)
GLUCOSE BLD STRIP.AUTO-MCNC: 93 MG/DL (ref 65–100)
SERVICE CMNT-IMP: ABNORMAL
SERVICE CMNT-IMP: NORMAL

## 2018-12-08 PROCEDURE — 74011250636 HC RX REV CODE- 250/636: Performed by: INTERNAL MEDICINE

## 2018-12-08 PROCEDURE — 65270000029 HC RM PRIVATE

## 2018-12-08 PROCEDURE — 82962 GLUCOSE BLOOD TEST: CPT

## 2018-12-08 PROCEDURE — 74011250637 HC RX REV CODE- 250/637: Performed by: NURSE PRACTITIONER

## 2018-12-08 PROCEDURE — 74011250637 HC RX REV CODE- 250/637: Performed by: HOSPITALIST

## 2018-12-08 PROCEDURE — 74011250637 HC RX REV CODE- 250/637: Performed by: SPECIALIST

## 2018-12-08 RX ADMIN — DEXAMETHASONE 2 MG: 4 TABLET ORAL at 17:51

## 2018-12-08 RX ADMIN — LEVETIRACETAM 1000 MG: 500 TABLET ORAL at 17:50

## 2018-12-08 RX ADMIN — BENZTROPINE MESYLATE 1 MG: 1 TABLET ORAL at 08:56

## 2018-12-08 RX ADMIN — HALOPERIDOL 5 MG: 5 TABLET ORAL at 17:50

## 2018-12-08 RX ADMIN — DOCUSATE SODIUM 100 MG: 100 CAPSULE, LIQUID FILLED ORAL at 08:56

## 2018-12-08 RX ADMIN — HALOPERIDOL 5 MG: 5 TABLET ORAL at 08:56

## 2018-12-08 RX ADMIN — LEVETIRACETAM 1000 MG: 500 TABLET ORAL at 08:56

## 2018-12-08 RX ADMIN — BENZTROPINE MESYLATE 1 MG: 1 TABLET ORAL at 17:51

## 2018-12-08 RX ADMIN — DIVALPROEX SODIUM 250 MG: 250 TABLET, EXTENDED RELEASE ORAL at 08:56

## 2018-12-08 RX ADMIN — DEXAMETHASONE 2 MG: 4 TABLET ORAL at 08:04

## 2018-12-08 RX ADMIN — LISINOPRIL 10 MG: 10 TABLET ORAL at 08:56

## 2018-12-08 RX ADMIN — DOCUSATE SODIUM 100 MG: 100 CAPSULE, LIQUID FILLED ORAL at 17:50

## 2018-12-08 RX ADMIN — DIVALPROEX SODIUM 500 MG: 500 TABLET, DELAYED RELEASE ORAL at 22:41

## 2018-12-08 RX ADMIN — PANTOPRAZOLE SODIUM 40 MG: 40 TABLET, DELAYED RELEASE ORAL at 08:04

## 2018-12-09 LAB
GLUCOSE BLD STRIP.AUTO-MCNC: 144 MG/DL (ref 65–100)
GLUCOSE BLD STRIP.AUTO-MCNC: 145 MG/DL (ref 65–100)
GLUCOSE BLD STRIP.AUTO-MCNC: 168 MG/DL (ref 65–100)
GLUCOSE BLD STRIP.AUTO-MCNC: 85 MG/DL (ref 65–100)
SERVICE CMNT-IMP: ABNORMAL
SERVICE CMNT-IMP: NORMAL

## 2018-12-09 PROCEDURE — 74011250637 HC RX REV CODE- 250/637: Performed by: NURSE PRACTITIONER

## 2018-12-09 PROCEDURE — 65270000029 HC RM PRIVATE

## 2018-12-09 PROCEDURE — 74011636637 HC RX REV CODE- 636/637: Performed by: HOSPITALIST

## 2018-12-09 PROCEDURE — 74011250637 HC RX REV CODE- 250/637: Performed by: HOSPITALIST

## 2018-12-09 PROCEDURE — 74011250636 HC RX REV CODE- 250/636: Performed by: INTERNAL MEDICINE

## 2018-12-09 PROCEDURE — 82962 GLUCOSE BLOOD TEST: CPT

## 2018-12-09 PROCEDURE — 74011250637 HC RX REV CODE- 250/637: Performed by: SPECIALIST

## 2018-12-09 RX ADMIN — DIVALPROEX SODIUM 500 MG: 500 TABLET, DELAYED RELEASE ORAL at 21:51

## 2018-12-09 RX ADMIN — DOCUSATE SODIUM 100 MG: 100 CAPSULE, LIQUID FILLED ORAL at 08:47

## 2018-12-09 RX ADMIN — HUMAN INSULIN 2 UNITS: 100 INJECTION, SOLUTION SUBCUTANEOUS at 12:07

## 2018-12-09 RX ADMIN — BENZTROPINE MESYLATE 1 MG: 1 TABLET ORAL at 17:36

## 2018-12-09 RX ADMIN — HUMAN INSULIN 2 UNITS: 100 INJECTION, SOLUTION SUBCUTANEOUS at 17:35

## 2018-12-09 RX ADMIN — DOCUSATE SODIUM 100 MG: 100 CAPSULE, LIQUID FILLED ORAL at 17:36

## 2018-12-09 RX ADMIN — DEXAMETHASONE 2 MG: 4 TABLET ORAL at 07:17

## 2018-12-09 RX ADMIN — DIVALPROEX SODIUM 250 MG: 250 TABLET, EXTENDED RELEASE ORAL at 08:47

## 2018-12-09 RX ADMIN — LISINOPRIL 10 MG: 10 TABLET ORAL at 08:47

## 2018-12-09 RX ADMIN — LEVETIRACETAM 1000 MG: 500 TABLET ORAL at 17:36

## 2018-12-09 RX ADMIN — HALOPERIDOL 5 MG: 5 TABLET ORAL at 08:47

## 2018-12-09 RX ADMIN — LEVETIRACETAM 1000 MG: 500 TABLET ORAL at 08:47

## 2018-12-09 RX ADMIN — HALOPERIDOL 5 MG: 5 TABLET ORAL at 17:36

## 2018-12-09 RX ADMIN — PANTOPRAZOLE SODIUM 40 MG: 40 TABLET, DELAYED RELEASE ORAL at 07:17

## 2018-12-09 RX ADMIN — BENZTROPINE MESYLATE 1 MG: 1 TABLET ORAL at 08:47

## 2018-12-09 RX ADMIN — DEXAMETHASONE 2 MG: 4 TABLET ORAL at 17:36

## 2018-12-10 ENCOUNTER — APPOINTMENT (OUTPATIENT)
Dept: GENERAL RADIOLOGY | Age: 53
DRG: 054 | End: 2018-12-10
Attending: INTERNAL MEDICINE
Payer: MEDICARE

## 2018-12-10 LAB
GLUCOSE BLD STRIP.AUTO-MCNC: 101 MG/DL (ref 65–100)
GLUCOSE BLD STRIP.AUTO-MCNC: 108 MG/DL (ref 65–100)
GLUCOSE BLD STRIP.AUTO-MCNC: 159 MG/DL (ref 65–100)
GLUCOSE BLD STRIP.AUTO-MCNC: 178 MG/DL (ref 65–100)
SERVICE CMNT-IMP: ABNORMAL

## 2018-12-10 PROCEDURE — 74011636637 HC RX REV CODE- 636/637: Performed by: HOSPITALIST

## 2018-12-10 PROCEDURE — 74011250636 HC RX REV CODE- 250/636: Performed by: INTERNAL MEDICINE

## 2018-12-10 PROCEDURE — 65270000029 HC RM PRIVATE

## 2018-12-10 PROCEDURE — 74011250637 HC RX REV CODE- 250/637: Performed by: HOSPITALIST

## 2018-12-10 PROCEDURE — 74011250637 HC RX REV CODE- 250/637: Performed by: SPECIALIST

## 2018-12-10 PROCEDURE — 71045 X-RAY EXAM CHEST 1 VIEW: CPT

## 2018-12-10 PROCEDURE — 82962 GLUCOSE BLOOD TEST: CPT

## 2018-12-10 PROCEDURE — 74011250637 HC RX REV CODE- 250/637: Performed by: NURSE PRACTITIONER

## 2018-12-10 RX ORDER — DEXAMETHASONE 2 MG/1
2 TABLET ORAL 2 TIMES DAILY WITH MEALS
Qty: 60 TAB | Refills: 0 | Status: SHIPPED | OUTPATIENT
Start: 2018-12-10

## 2018-12-10 RX ORDER — LEVETIRACETAM 1000 MG/1
1000 TABLET ORAL 2 TIMES DAILY
Qty: 60 TAB | Refills: 0 | Status: SHIPPED | OUTPATIENT
Start: 2018-12-10

## 2018-12-10 RX ADMIN — HUMAN INSULIN 2 UNITS: 100 INJECTION, SOLUTION SUBCUTANEOUS at 17:11

## 2018-12-10 RX ADMIN — HALOPERIDOL 5 MG: 5 TABLET ORAL at 17:11

## 2018-12-10 RX ADMIN — DEXAMETHASONE 2 MG: 4 TABLET ORAL at 07:32

## 2018-12-10 RX ADMIN — LISINOPRIL 10 MG: 10 TABLET ORAL at 08:31

## 2018-12-10 RX ADMIN — DOCUSATE SODIUM 100 MG: 100 CAPSULE, LIQUID FILLED ORAL at 17:11

## 2018-12-10 RX ADMIN — DOCUSATE SODIUM 100 MG: 100 CAPSULE, LIQUID FILLED ORAL at 08:31

## 2018-12-10 RX ADMIN — PANTOPRAZOLE SODIUM 40 MG: 40 TABLET, DELAYED RELEASE ORAL at 07:32

## 2018-12-10 RX ADMIN — BENZTROPINE MESYLATE 1 MG: 1 TABLET ORAL at 08:31

## 2018-12-10 RX ADMIN — DIVALPROEX SODIUM 250 MG: 250 TABLET, EXTENDED RELEASE ORAL at 08:30

## 2018-12-10 RX ADMIN — HALOPERIDOL 5 MG: 5 TABLET ORAL at 08:31

## 2018-12-10 RX ADMIN — DEXAMETHASONE 2 MG: 4 TABLET ORAL at 17:11

## 2018-12-10 RX ADMIN — BENZTROPINE MESYLATE 1 MG: 1 TABLET ORAL at 17:11

## 2018-12-10 RX ADMIN — LEVETIRACETAM 1000 MG: 500 TABLET ORAL at 08:31

## 2018-12-10 RX ADMIN — DIVALPROEX SODIUM 500 MG: 500 TABLET, DELAYED RELEASE ORAL at 21:47

## 2018-12-10 RX ADMIN — LEVETIRACETAM 1000 MG: 500 TABLET ORAL at 17:10

## 2018-12-10 NOTE — PROGRESS NOTES
Cm was informed that patient is ready for discharge today. Cm talked with Kate Grey at [facility] [phone] and was informed that patient was evaluated by Dr Jeanie Anand. Even though Dr Jeanie Anand voiced no concerns to CM on 12/7/18 regarding the AL meeting patient's needs, patient cannot be admitted to the AL until the following are received as patient is considered a new resident. .. 1. New UAI completed with DMAS 96 and faxed to the [address] [place] [address] fax [phone]. 
 
2. New H/P must be completed on the State medical form  Kate Grey is faxing the form to  at [phone].  
 
3. Either Chest X-ray or TB test must be completed and results faxed to the [address] [place] [address] at [phone] Once  all the above is received by the AL, Kate Grey said it will be reviewed by the DON and CM will be called with decision of acceptance. Ariane Alex (mother) and cousin  Jose Blackwell [phone] are contacts and will be called when patient is being discharged.

## 2018-12-10 NOTE — PROGRESS NOTES
Hospitalist Progress Note [address] [facility], DO Answering service: [phone] OR [phone] from in house phone Date of Service:  2018 NAME:  Robert Monae :  1965 MRN:  [id number] Admission Summary:  
52-year-old gentleman who has history of schizophrenia, currently resides at [facility], was sent as he has been seen by the staff with slurred speech and left-sided weakness off and on. 
2018 he had a CAT scan of the head done which showed a mass suggestive of high-grade glioma. Interval history / Subjective:  
Pt seen and examined. No complaints. Pleasant. Possible discharge to [facility] Monday or Tuesday. Assessment & Plan:  
 
Brain mass, suggestive of high-grade Glioma Repeat CT on admission showed significant interval increase in the size of large right temporoparietal heterogenous mass with solid and cystic component suspicious for high-grade lymphoma Neuro surgery consulted, its large cystic mass, not operable per NS Decadron,Keppra & mannitol- palliative following: -  Family agreed on discharge on Hospice. Continue decadron. Schizophrenia- Haldol, depakot Poor insight and judgement- Refusing tx, wants to leave AMA -TDO obtained  DM SSI 
HLD Cont statin Code status:Full DVT prophylaxis: SCD Disposition: Hospice Hospital Problems  Date Reviewed: 2016 Codes Class Noted POA * (Principal) Brain mass ICD-10-CM: G93.9 ICD-9-CM: 348.9  2018 Yes Review of Systems: A comprehensive review of systems was negative except for that written in the HPI. Vital Signs:  
 Last 24hrs VS reviewed since prior progress note. Most recent are: 
Visit Vitals /54 Pulse 71 Temp 98.1 °F (36.7 °C) Resp 18 Ht 5' 8\" (1.727 m) Wt 54.6 kg (120 lb 5.9 oz) SpO2 100% BMI 18.30 kg/m² Intake/Output Summary (Last 24 hours) at 2018 8143 Last data filed at 12/9/2018 1816 Gross per 24 hour Intake 1570 ml Output  Net 1570 ml Physical Examination:  
 
 
     
Constitutional:  No acute distress, pleasant Resp:  CTA bilaterally. No wheezing/rhonchi/rales. No accessory muscle use CV:  Regular rhythm, normal rate, no murmurs, GI:  Soft, non distended, non tender. normoactive bowel sounds Musculoskeletal:  No edema, warm Neurologic:  Moves all extremities. AAOx2 Psych:  poor insight and jugdement Data Review:  
 Review and/or order of clinical lab test 
 
 
Labs:  
 
No results for input(s): WBC, HGB, HCT, PLT, HGBEXT, HCTEXT, PLTEXT, HGBEXT, HCTEXT, PLTEXT in the last 72 hours. No results for input(s): NA, K, CL, CO2, BUN, CREA, GLU, CA, MG, PHOS, URICA in the last 72 hours. No results for input(s): SGOT, GPT, ALT, AP, TBIL, TBILI, TP, ALB, GLOB, GGT, AML, LPSE in the last 72 hours. No lab exists for component: AMYP, HLPSE No results for input(s): INR, PTP, APTT in the last 72 hours. No lab exists for component: INREXT, INREXT No results for input(s): FE, TIBC, PSAT, FERR in the last 72 hours. No results found for: FOL, RBCF No results for input(s): PH, PCO2, PO2 in the last 72 hours. No results for input(s): CPK, CKNDX, TROIQ in the last 72 hours. No lab exists for component: CPKMB No results found for: CHOL, CHOLX, CHLST, CHOLV, HDL, LDL, LDLC, DLDLP, TGLX, TRIGL, TRIGP, CHHD, CHHDX Lab Results Component Value Date/Time Glucose (POC) 144 (H) 12/09/2018 04:27 PM  
 Glucose (POC) 168 (H) 12/09/2018 11:15 AM  
 Glucose (POC) 85 12/09/2018 06:37 AM  
 Glucose (POC) 119 (H) 12/08/2018 08:57 PM  
 Glucose (POC) 132 (H) 12/08/2018 04:22 PM  
 
Lab Results Component Value Date/Time  Color YELLOW/STRAW 08/21/2018 07:30 PM  
 Appearance CLEAR 08/21/2018 07:30 PM  
 Specific gravity 1.015 08/21/2018 07:30 PM  
 pH (UA) 6.5 08/21/2018 07:30 PM  
 Protein NEGATIVE  08/21/2018 07:30 PM  
 Glucose NEGATIVE  08/21/2018 07:30 PM  
 Ketone NEGATIVE  08/21/2018 07:30 PM  
 Bilirubin NEGATIVE  08/21/2018 07:30 PM  
 Urobilinogen 0.2 08/21/2018 07:30 PM  
 Nitrites NEGATIVE  08/21/2018 07:30 PM  
 Leukocyte Esterase NEGATIVE  08/21/2018 07:30 PM  
 Epithelial cells FEW 08/21/2018 07:30 PM  
 Bacteria NEGATIVE  08/21/2018 07:30 PM  
 WBC 0-4 08/21/2018 07:30 PM  
 RBC 0-5 08/21/2018 07:30 PM  
 
 
 
Medications Reviewed:  
 
Current Facility-Administered Medications Medication Dose Route Frequency  dexamethasone (DECADRON) tablet 2 mg  2 mg Oral BID WITH MEALS  influenza vaccine 2018-19 (6 mos+)(PF) (FLUARIX QUAD/FLULAVAL QUAD) injection 0.5 mL  0.5 mL IntraMUSCular PRIOR TO DISCHARGE  lisinopril (PRINIVIL, ZESTRIL) tablet 10 mg  10 mg Oral DAILY  cloNIDine HCl (CATAPRES) tablet 0.1 mg  0.1 mg Oral Q4H PRN  
 sodium chloride (NS) flush 5-10 mL  5-10 mL IntraVENous PRN  
 benztropine (COGENTIN) tablet 1 mg  1 mg Oral BID  divalproex DR (DEPAKOTE) tablet 500 mg  500 mg Oral QHS  divalproex ER (DEPAKOTE ER) 24 hour tablet 250 mg  250 mg Oral DAILY  docusate sodium (COLACE) capsule 100 mg  100 mg Oral BID  
 haloperidol (HALDOL) tablet 5 mg  5 mg Oral BID  insulin regular (NOVOLIN R, HUMULIN R) injection   SubCUTAneous AC&HS  
 glucose chewable tablet 16 g  4 Tab Oral PRN  
 dextrose (D50W) injection syrg 12.5-25 g  12.5-25 g IntraVENous PRN  
 glucagon (GLUCAGEN) injection 1 mg  1 mg IntraMUSCular PRN  pantoprazole (PROTONIX) tablet 40 mg  40 mg Oral ACB  levETIRAcetam (KEPPRA) tablet 1,000 mg  1,000 mg Oral BID  
 
______________________________________________________________________ EXPECTED LENGTH OF STAY: 4d 12h ACTUAL LENGTH OF STAY:          15 [address] [facility],

## 2018-12-10 NOTE — DISCHARGE SUMMARY
Discharge Summary       PATIENT ID: Antonia Baker  MRN: [id number]   YOB: 1965    DATE OF ADMISSION: 11/24/2018  5:49 PM    DATE OF DISCHARGE: 12/10/2018  PRIMARY CARE PROVIDER: Unknown, Provider     ATTENDING PHYSICIAN: Rufina Dakin, DO   DISCHARGING PROVIDER: Rufina Dakin, DO    To contact this individual call [phone] and ask the  to page. If unavailable ask to be transferred the Adult Hospitalist Department. CONSULTATIONS: IP CONSULT TO NEUROSURGERY  IP CONSULT TO PSYCHIATRY    PROCEDURES/SURGERIES: * No surgery found *    ADMITTING DIAGNOSES & HOSPITAL COURSE:       52year old male with past medical history schizophrenia, resident at [facility], who presents to [facility] with slurred speech and left-sided weakness. CT demonstrated evidence of right parietotemporal brain mass, with solid and cystic components, suspicious for high-grade glioma. Neurosurgery consulted, not a surgical candidate due to size and invasiveness of tumor. Palliative care consulted. Family considering hospice. Patient started on decadron and keppra. Patient was stable to discharge back to [facility]. CT head:  IMPRESSION: Right parietotemporal partially cystic, partially solid enhancing  intra-axial mass which has increased significantly in size compared to prior CT  dated August 2018. Acute hemorrhage is present within this mass. 1.3 cm of right  left subfalcine herniation. 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 MR  with contrast can be performed for further evaluation, as indicated.     Patient's mother Jaycee Plata and cousin Iglesia Rodriguez are health care agents: [phone]        DISCHARGE DIAGNOSES / PLAN:      Brain mass, suggestive of high-grade Glioma  Repeat CT on admission showed significant interval increase in the size of large right temporoparietal heterogenous mass with solid and cystic component suspicious for high-grade glioma  Neurosurgery consulted, its large cystic mass, not operable per NS  -Continue Decadron, Keppra  Family met with palliative care- considering hospice on discharge     Schizophrenia- Haldol, depakot  Poor insight and judgement- Refusing tx, wants to leave AMA -TDO obtained 11/25  DM SSI  HLD Cont statin       PENDING TEST RESULTS:   At the time of discharge the following test results are still pending: none    FOLLOW UP APPOINTMENTS:    Follow-up Information     Follow up With Specialties Details Why Contact Info    Unknown, Provider    Patient not available to ask               ADDITIONAL CARE RECOMMENDATIONS:  -Need follow up appointment with PCP within 7 days of discharge  -Stay in contact with family to transition to hospice   --Patient's mother Jayda Domínguez and cousin Marilee Bernstein are health care agents: [phone]  -New prescriptions for Keppra and dexamethasone. DIET: Regular Diet  As listed below    ACTIVITY: Activity as tolerated    WOUND CARE: none    EQUIPMENT needed: none        DISCHARGE MEDICATIONS:  Current Discharge Medication List      START taking these medications    Details   dexamethasone (DECADRON) 2 mg tablet Take 1 Tab by mouth two (2) times daily (with meals). Qty: 60 Tab, Refills: 0      levETIRAcetam (KEPPRA) 1,000 mg tablet Take 1 Tab by mouth two (2) times a day. Qty: 60 Tab, Refills: 0         CONTINUE these medications which have NOT CHANGED    Details   benztropine (COGENTIN) 1 mg tablet Take 1 mg by mouth two (2) times a day. divalproex DR (DEPAKOTE) 250 mg tablet Take 500 mg by mouth nightly.      haloperidol (HALDOL) 5 mg tablet Take 5 mg by mouth two (2) times a day. Omeprazole delayed release (PRILOSEC D/R) 20 mg tablet Take 20 mg by mouth daily. senna (SENNA) 8.6 mg tablet Take 1 Tab by mouth daily as needed for Constipation. lactulose (CHRONULAC) 10 gram/15 mL solution Take  by mouth daily as needed for Other.       ibuprofen (MOTRIN) 400 mg tablet Take  by mouth every six (6) hours as needed for Pain. docusate sodium (COLACE) 100 mg capsule Take 100 mg by mouth two (2) times a day. cholecalciferol (VITAMIN D3) 1,000 unit tablet Take 1,000 Units by mouth daily. psyllium husk (METAMUCIL PO) Take  by mouth two (2) times a day. lisinopril (PRINIVIL, ZESTRIL) 10 mg tablet Take 10 mg by mouth daily. STOP taking these medications       divalproex ER (DEPAKOTE ER) 250 mg ER tablet Comments:   Reason for Stopping:         PSEUDOEPHEDRINE-dextromethorphan-guaiFENesin (ROBAFEN CF) 30- mg/5 mL solution Comments:   Reason for Stopping:         carbamide peroxide (DEBROX) 6.5 % otic solution Comments:   Reason for Stopping:         metFORMIN (GLUCOPHAGE) 1,000 mg tablet Comments:   Reason for Stopping:         traZODone (DESYREL) 100 mg tablet Comments:   Reason for Stopping:         ziprasidone hcl (GEODON) 80 mg capsule Comments:   Reason for Stopping:         simvastatin (ZOCOR) 10 mg tablet Comments:   Reason for Stopping:                 NOTIFY YOUR PHYSICIAN FOR ANY OF THE FOLLOWING:   Fever over 101 degrees for 24 hours. Chest pain, shortness of breath, fever, chills, nausea, vomiting, diarrhea, change in mentation, falling, weakness, bleeding. Severe pain or pain not relieved by medications. Or, any other signs or symptoms that you may have questions about. DISPOSITION:    Home With:   OT  PT  HH  RN       Long term SNF/Inpatient Rehab    Independent/assisted living    Hospice    Other:       PATIENT CONDITION AT DISCHARGE:     Functional status    Poor     Deconditioned    x Independent      Cognition     Lucid    x Forgetful     Dementia      Catheters/lines (plus indication)    Connell     PICC     PEG    x None      Code status     Full code    x DNR      PHYSICAL EXAMINATION AT DISCHARGE:                                             Constitutional:  No acute distress, pleasant         Resp:  CTA bilaterally. No wheezing/rhonchi/rales.  No accessory muscle use CV:  Regular rhythm, normal rate, no murmurs,    GI:  Soft, non distended, non tender. normoactive bowel sounds    Musculoskeletal:  No edema, warm    Neurologic:  Moves all extremities.   AAOx2                         Psych:  poor insight and jugdement                CHRONIC MEDICAL DIAGNOSES:  Problem List as of 12/10/2018 Date Reviewed: 9/27/2016          Codes Class Noted - Resolved    * (Principal) Brain mass ICD-10-CM: G93.9  ICD-9-CM: 348.9  11/24/2018 - Present        Perirectal abscess ICD-10-CM: K61.1  ICD-9-CM: 629  9/27/2016 - Present              Greater than 30 minutes were spent with the patient on counseling and coordination of care    Signed:   Odalys Conley DO  12/10/2018  10:41 AM

## 2018-12-11 LAB
GLUCOSE BLD STRIP.AUTO-MCNC: 131 MG/DL (ref 65–100)
GLUCOSE BLD STRIP.AUTO-MCNC: 153 MG/DL (ref 65–100)
GLUCOSE BLD STRIP.AUTO-MCNC: 207 MG/DL (ref 65–100)
GLUCOSE BLD STRIP.AUTO-MCNC: 97 MG/DL (ref 65–100)
SERVICE CMNT-IMP: ABNORMAL
SERVICE CMNT-IMP: NORMAL

## 2018-12-11 PROCEDURE — 74011250637 HC RX REV CODE- 250/637: Performed by: NURSE PRACTITIONER

## 2018-12-11 PROCEDURE — 65270000029 HC RM PRIVATE

## 2018-12-11 PROCEDURE — 74011250636 HC RX REV CODE- 250/636: Performed by: INTERNAL MEDICINE

## 2018-12-11 PROCEDURE — 74011250637 HC RX REV CODE- 250/637: Performed by: SPECIALIST

## 2018-12-11 PROCEDURE — 82962 GLUCOSE BLOOD TEST: CPT

## 2018-12-11 PROCEDURE — 74011250637 HC RX REV CODE- 250/637: Performed by: HOSPITALIST

## 2018-12-11 RX ADMIN — LEVETIRACETAM 1000 MG: 500 TABLET ORAL at 09:23

## 2018-12-11 RX ADMIN — PANTOPRAZOLE SODIUM 40 MG: 40 TABLET, DELAYED RELEASE ORAL at 07:11

## 2018-12-11 RX ADMIN — LISINOPRIL 10 MG: 10 TABLET ORAL at 09:23

## 2018-12-11 RX ADMIN — BENZTROPINE MESYLATE 1 MG: 1 TABLET ORAL at 09:23

## 2018-12-11 RX ADMIN — DEXAMETHASONE 2 MG: 4 TABLET ORAL at 18:44

## 2018-12-11 RX ADMIN — BENZTROPINE MESYLATE 1 MG: 1 TABLET ORAL at 18:44

## 2018-12-11 RX ADMIN — DIVALPROEX SODIUM 500 MG: 500 TABLET, DELAYED RELEASE ORAL at 21:04

## 2018-12-11 RX ADMIN — HALOPERIDOL 5 MG: 5 TABLET ORAL at 09:23

## 2018-12-11 RX ADMIN — DOCUSATE SODIUM 100 MG: 100 CAPSULE, LIQUID FILLED ORAL at 09:23

## 2018-12-11 RX ADMIN — DEXAMETHASONE 2 MG: 4 TABLET ORAL at 07:11

## 2018-12-11 RX ADMIN — HALOPERIDOL 5 MG: 5 TABLET ORAL at 18:44

## 2018-12-11 RX ADMIN — LEVETIRACETAM 1000 MG: 500 TABLET ORAL at 18:44

## 2018-12-11 RX ADMIN — DIVALPROEX SODIUM 250 MG: 250 TABLET, EXTENDED RELEASE ORAL at 09:23

## 2018-12-11 RX ADMIN — DOCUSATE SODIUM 100 MG: 100 CAPSULE, LIQUID FILLED ORAL at 18:44

## 2018-12-11 NOTE — PROGRESS NOTES
NUTRITION COMPLETE ASSESSMENT    RECOMMENDATIONS:   1. Continue current diet  2. Check A1c% to help determine longer term BG control needs   3. Continue ONS with meals  4. Weekly Weight - please check today     Interventions/Plan:   Food/Nutrient Delivery:    Commercial supplement       Ensure Compact with meals (220cals & 9g Pro each), Boost Fortified Pudding BID   Nutrition Education:   RD Recommends Ensure High Protein or Glucerna for home use  Coordination of Care:  attends rounds     Assessment:   Reason for Assessment:   [] Provider Consult  []BPA/MST Referral   []LOS   [x]Reassessment   []NPO/Clear Liquid   []At Nutrition Risk  []Other    Diet: Regular  Supplements: Ensure Compact TID, Boost Pudding x1   Nutritionally Significant Medications: [x] Reviewed & Includes: decadron, depakote, colace, haldol, SSI, Protonix, keppra     Meal Intake:   Patient Vitals for the past 100 hrs:   % Diet Eaten   12/09/18 1816 100 %   12/09/18 1438 100 %   12/09/18 0837 100 %   12/08/18 1929 100 %   12/08/18 1514 100 %   12/08/18 0855 100 %   12/07/18 1858 100 %   12/07/18 1423 100 %       Pre-Hospitalization:  Usual Appetite: Excellent  Diet at Home: Regular    Current Hospitalization:   Fluid Restriction:    Appetite: Excellent  PO Ability: With assist Average po intake:%  Average supplements intake: 75%      Subjective:  Pt up and dressed, walking the hallway & moving around his room. Pt states he's eating well and likes the food. Voices he likes coffee & chocolate Ensure. Objective:  Pt admitted for weakness & slurred speech, resides at [facility]. PMHx: schizophrenia. Medications include metformin without mention of history of DM. Pt with some elevation in BG but also getting Decadron BID. Checking an A1c% may help determine if continued need for metformin at discharge or if this can be eliminated or reduced given the diagnosis. Pt with poor denture but reports he can eat anything without any issues.  Not enough wt history available to determine significant wt loss. Pt with low BMI will benefit from ONS in addition to eating meals to help maintain weight and nutrients. This should continue beyond these hospital days at least 30 days and likely indefinitely. Follow Up 12/11/18:   Pt continues to eat 100% of meals & most Ensure Compact Supplements offered, moving around room independently, likes supplements, awaiting placement for long term care. No new weights since 11/28. Please recheck . Occasional elevated BG, no A1c% known. Elevation likely with steroid. Diet has not been limited nor should it be at this point. Pt has lost significant wt PTA & has high calorie needs. Estimated Nutrition Needs:   Kcals/day: 1900 Kcals/day  Protein: 82 g(1.5 g/kg of current wt)  Fluid: 1900 ml(35 mL/kg of current wt)  Based On: Kcal/kg - specify (Comment)(~35 kcal/kg of current wt)  Weight Used: Actual wt(54.6 kg)    Pt expected to meet estimated nutrient needs:  [x]   Yes     []  No [] Unable to predict at this time    Nutrition Diagnosis:   1.  Increased nutrient needs related to total calories  as evidenced by brain mass and low BMI       Goals:     tolerance of 75% of ONS and meals within 1 week; Stable weight +/- 2kg during admission     Monitoring & Evaluation:    - Total energy intake   - Weight/weight change   -      Previous Nutrition Goals Met:   Progressing    Previous Recommendations:    Progressing    Education & Discharge Needs:   [] None Identified   [] Identified and addressed: daily use of added nutritional supplement to increase calories    [x] Participated in care plan, discharge planning, and/or interdisciplinary rounds        Cultural, Restorationist and ethnic food preferences identified: None    Skin Integrity: [x]Intact  []Other  Edema: [x]None []Other  Last BM: 12/10  Food Allergies: [x]None []Other  Diet Restrictions: Cultural/Amish Preference(s): None     Anthropometrics:    Weight Loss Metrics 11/28/2018 8/21/2018 9/27/2016 1/8/2015 1/19/2011   Today's Wt 120 lb 5.9 oz 133 lb 172 lb 160 lb 160 lb   BMI 18.3 kg/m2 20.22 kg/m2 26.15 kg/m2 24.33 kg/m2 24.33 kg/m2      Last 3 Recorded Weights in this Encounter    11/26/18 0400 11/27/18 0400 11/28/18 0400   Weight: 54.1 kg (119 lb 4.3 oz) 52.5 kg (115 lb 11.9 oz) 54.6 kg (120 lb 5.9 oz)     Weight Source: Bed  Height: 5' 8\" (172.7 cm),    Body mass index is 18.3 kg/m².   IBW : 69.9 kg (154 lb),    Usual Body Weight: 56.7 kg (125 lb),      Labs:    Lab Results   Component Value Date/Time    Sodium 138 11/24/2018 06:00 PM    Potassium 3.6 11/24/2018 06:00 PM    Chloride 102 11/24/2018 06:00 PM    CO2 28 11/24/2018 06:00 PM    Glucose 119 (H) 11/24/2018 06:00 PM    BUN 15 11/24/2018 06:00 PM    Creatinine 0.99 11/24/2018 06:00 PM    Calcium 8.6 11/24/2018 06:00 PM    Albumin 3.6 11/24/2018 06:00 PM     No results found for: HBA1C, HGBE8, KSY8PKXV, YKF6GZCT, [address] [facility], RD, MS, CDE  Pager #[phone] or [phone]

## 2018-12-11 NOTE — PROGRESS NOTES
CM continuing to follow. CM received completed medicals from [address] [place] [address]. CM faxed document and CXR results to [facility] at [phone]. CM called Shwetha Lara (at the [facility] [phone]) to advise of documents being faxed. She is requesting a copy of a newly completed UAI (to be faxed to her before) CM submits for processing. Shwetha Lara states one the document is reviewed by her team at the facility, a final decision for acceptance will be rendered. CM will complete and fax to agency. IVIS Abdi, CRM    3:09p  CM faxed completed UAI to the facility. UAI also submitted for processing.   IVIS Abdi, CRM

## 2018-12-11 NOTE — PROGRESS NOTES
Hospitalist Progress Note  Margi Byrnes MD  Answering service: [phone] from in house phone        Date of Service:  2018  NAME:  Mikki Dewey  :  1965  MRN:  [id number]      Admission Summary:   78-year-old gentleman who has history of schizophrenia, currently resides at [facility], was sent as he has been seen by the staff with slurred speech and left-sided weakness off and on.  2018 he had a CAT scan of the head done which showed a mass suggestive of high-grade glioma. Interval history / Subjective:   Pt seen and examined; ok medically for dc when accepting facility established, discussed with case 2018 ; no new clinical issues. Assessment & Plan:     Brain mass, suggestive of high-grade Glioma  Repeat CT on admission showed significant interval increase in the size of large right temporoparietal heterogenous mass with solid and cystic component suspicious for high-grade lymphoma   Neuro surgery consulted, its large cystic mass, not operable per NS  Decadron,Keppra & mannitol- palliative following:  -  Family agreed on discharge on Hospice. Continue decadron. Schizophrenia- Haldol, depakot  Poor insight and judgement- Refusing tx, wants to leave AMA -TDO obtained   DM SSI  HLD Cont statin    Code status:Full   DVT prophylaxis: SCD  Disposition: Hospice     Hospital Problems  Date Reviewed: 2016          Codes Class Noted POA    * (Principal) Brain mass ICD-10-CM: G93.9  ICD-9-CM: 348.9  2018 Yes                Review of Systems:    pt denies cp, sob, n/v/d/f/chills,       Vital Signs:    Last 24hrs VS reviewed since prior progress note.  Most recent are:  Visit Vitals  /89 (BP 1 Location: Right arm, BP Patient Position: Sitting)   Pulse 71   Temp 98.7 °F (37.1 °C)   Resp 14   Ht 5' 8\" (1.727 m)   Wt 54.6 kg (120 lb 5.9 oz)   SpO2 100%   BMI 18.30 kg/m²       No intake or output data in the 24 hours ending 12/11/18 1312     Physical Examination:             Constitutional:  No acute distress, pleasant   Stable exam 12/11/2018     Resp:  CTA bilaterally. No wheezing/rhonchi/rales. No accessory muscle use   CV:  Regular rhythm, normal rate, no murmurs,    GI:  Soft, non distended, non tender. normoactive bowel sounds    Musculoskeletal:  No edema, warm    Neurologic:  Moves all extremities. AAOx2     Psych:  poor insight and jugdement       Data Review:    Review and/or order of clinical lab test      Labs:     No results for input(s): WBC, HGB, HCT, PLT, HGBEXT, HCTEXT, PLTEXT, HGBEXT, HCTEXT, PLTEXT in the last 72 hours. No results for input(s): NA, K, CL, CO2, BUN, CREA, GLU, CA, MG, PHOS, URICA in the last 72 hours. No results for input(s): SGOT, GPT, ALT, AP, TBIL, TBILI, TP, ALB, GLOB, GGT, AML, LPSE in the last 72 hours. No lab exists for component: AMYP, HLPSE  No results for input(s): INR, PTP, APTT in the last 72 hours. No lab exists for component: INREXT, INREXT   No results for input(s): FE, TIBC, PSAT, FERR in the last 72 hours. No results found for: FOL, RBCF   No results for input(s): PH, PCO2, PO2 in the last 72 hours. No results for input(s): CPK, CKNDX, TROIQ in the last 72 hours.     No lab exists for component: CPKMB  No results found for: CHOL, CHOLX, CHLST, CHOLV, HDL, LDL, LDLC, DLDLP, TGLX, TRIGL, TRIGP, CHHD, CHHDX  Lab Results   Component Value Date/Time    Glucose (POC) 131 (H) 12/11/2018 12:35 PM    Glucose (POC) 97 12/11/2018 06:26 AM    Glucose (POC) 159 (H) 12/10/2018 09:15 PM    Glucose (POC) 178 (H) 12/10/2018 04:06 PM    Glucose (POC) 108 (H) 12/10/2018 11:10 AM     Lab Results   Component Value Date/Time    Color YELLOW/STRAW 08/21/2018 07:30 PM    Appearance CLEAR 08/21/2018 07:30 PM    Specific gravity 1.015 08/21/2018 07:30 PM    pH (UA) 6.5 08/21/2018 07:30 PM    Protein NEGATIVE  08/21/2018 07:30 PM    Glucose NEGATIVE  08/21/2018 07:30 PM Ketone NEGATIVE  08/21/2018 07:30 PM    Bilirubin NEGATIVE  08/21/2018 07:30 PM    Urobilinogen 0.2 08/21/2018 07:30 PM    Nitrites NEGATIVE  08/21/2018 07:30 PM    Leukocyte Esterase NEGATIVE  08/21/2018 07:30 PM    Epithelial cells FEW 08/21/2018 07:30 PM    Bacteria NEGATIVE  08/21/2018 07:30 PM    WBC 0-4 08/21/2018 07:30 PM    RBC 0-5 08/21/2018 07:30 PM         Medications Reviewed:     Current Facility-Administered Medications   Medication Dose Route Frequency    dexamethasone (DECADRON) tablet 2 mg  2 mg Oral BID WITH MEALS    influenza vaccine 2018-19 (6 mos+)(PF) (FLUARIX QUAD/FLULAVAL QUAD) injection 0.5 mL  0.5 mL IntraMUSCular PRIOR TO DISCHARGE    lisinopril (PRINIVIL, ZESTRIL) tablet 10 mg  10 mg Oral DAILY    cloNIDine HCl (CATAPRES) tablet 0.1 mg  0.1 mg Oral Q4H PRN    sodium chloride (NS) flush 5-10 mL  5-10 mL IntraVENous PRN    benztropine (COGENTIN) tablet 1 mg  1 mg Oral BID    divalproex DR (DEPAKOTE) tablet 500 mg  500 mg Oral QHS    divalproex ER (DEPAKOTE ER) 24 hour tablet 250 mg  250 mg Oral DAILY    docusate sodium (COLACE) capsule 100 mg  100 mg Oral BID    haloperidol (HALDOL) tablet 5 mg  5 mg Oral BID    insulin regular (NOVOLIN R, HUMULIN R) injection   SubCUTAneous AC&HS    glucose chewable tablet 16 g  4 Tab Oral PRN    dextrose (D50W) injection syrg 12.5-25 g  12.5-25 g IntraVENous PRN    glucagon (GLUCAGEN) injection 1 mg  1 mg IntraMUSCular PRN    pantoprazole (PROTONIX) tablet 40 mg  40 mg Oral ACB    levETIRAcetam (KEPPRA) tablet 1,000 mg  1,000 mg Oral BID     ______________________________________________________________________  EXPECTED LENGTH OF STAY: 4d 12h  ACTUAL LENGTH OF STAY:          Miguel Naqvi MD

## 2018-12-12 LAB
GLUCOSE BLD STRIP.AUTO-MCNC: 115 MG/DL (ref 65–100)
GLUCOSE BLD STRIP.AUTO-MCNC: 118 MG/DL (ref 65–100)
GLUCOSE BLD STRIP.AUTO-MCNC: 121 MG/DL (ref 65–100)
GLUCOSE BLD STRIP.AUTO-MCNC: 93 MG/DL (ref 65–100)
SERVICE CMNT-IMP: ABNORMAL
SERVICE CMNT-IMP: NORMAL

## 2018-12-12 PROCEDURE — 74011250637 HC RX REV CODE- 250/637: Performed by: NURSE PRACTITIONER

## 2018-12-12 PROCEDURE — 74011250637 HC RX REV CODE- 250/637: Performed by: HOSPITALIST

## 2018-12-12 PROCEDURE — 74011250636 HC RX REV CODE- 250/636: Performed by: INTERNAL MEDICINE

## 2018-12-12 PROCEDURE — 65270000029 HC RM PRIVATE

## 2018-12-12 PROCEDURE — 82962 GLUCOSE BLOOD TEST: CPT

## 2018-12-12 PROCEDURE — 74011250637 HC RX REV CODE- 250/637: Performed by: SPECIALIST

## 2018-12-12 RX ADMIN — DOCUSATE SODIUM 100 MG: 100 CAPSULE, LIQUID FILLED ORAL at 17:23

## 2018-12-12 RX ADMIN — BENZTROPINE MESYLATE 1 MG: 1 TABLET ORAL at 17:23

## 2018-12-12 RX ADMIN — DIVALPROEX SODIUM 250 MG: 250 TABLET, EXTENDED RELEASE ORAL at 09:42

## 2018-12-12 RX ADMIN — BENZTROPINE MESYLATE 1 MG: 1 TABLET ORAL at 09:42

## 2018-12-12 RX ADMIN — HALOPERIDOL 5 MG: 5 TABLET ORAL at 09:42

## 2018-12-12 RX ADMIN — DEXAMETHASONE 2 MG: 4 TABLET ORAL at 17:24

## 2018-12-12 RX ADMIN — LEVETIRACETAM 1000 MG: 500 TABLET ORAL at 17:23

## 2018-12-12 RX ADMIN — LISINOPRIL 10 MG: 10 TABLET ORAL at 09:42

## 2018-12-12 RX ADMIN — PANTOPRAZOLE SODIUM 40 MG: 40 TABLET, DELAYED RELEASE ORAL at 07:19

## 2018-12-12 RX ADMIN — LEVETIRACETAM 1000 MG: 500 TABLET ORAL at 09:42

## 2018-12-12 RX ADMIN — DIVALPROEX SODIUM 500 MG: 500 TABLET, DELAYED RELEASE ORAL at 22:18

## 2018-12-12 RX ADMIN — DEXAMETHASONE 2 MG: 4 TABLET ORAL at 07:18

## 2018-12-12 RX ADMIN — DOCUSATE SODIUM 100 MG: 100 CAPSULE, LIQUID FILLED ORAL at 09:42

## 2018-12-12 RX ADMIN — HALOPERIDOL 5 MG: 5 TABLET ORAL at 17:22

## 2018-12-12 NOTE — PROGRESS NOTES
Problem: Falls - Risk of  Goal: *Absence of Falls  Document Darian Fall Risk and appropriate interventions in the flowsheet.   Outcome: Progressing Towards Goal  Fall Risk Interventions:  Mobility Interventions: Communicate number of staff needed for ambulation/transfer    Mentation Interventions: Adequate sleep, hydration, pain control    Medication Interventions: Evaluate medications/consider consulting pharmacy, Teach patient to arise slowly    Elimination Interventions: Call light in reach    History of Falls Interventions: Bed/chair exit alarm

## 2018-12-12 NOTE — PROGRESS NOTES
Problem: Falls - Risk of  Goal: *Absence of Falls  Document Darian Fall Risk and appropriate interventions in the flowsheet.   Outcome: Progressing Towards Goal  Fall Risk Interventions:  Mobility Interventions: Communicate number of staff needed for ambulation/transfer    Mentation Interventions: Adequate sleep, hydration, pain control    Medication Interventions: Evaluate medications/consider consulting pharmacy    Elimination Interventions: Call light in reach    History of Falls Interventions: Bed/chair exit alarm

## 2018-12-12 NOTE — PROGRESS NOTES
Hospitalist Progress Note  Dmitriy Ramos MD  Answering service: [phone] from in house phone        Date of Service:  2018  NAME:  Jose Tarango  :  1965  MRN:  [id number]      Admission Summary:   52-year-old gentleman who has history of schizophrenia, currently resides at [facility], was sent as he has been seen by the staff with slurred speech and left-sided weakness off and on.  2018 he had a CAT scan of the head done which showed a mass suggestive of high-grade glioma. Interval history / Subjective:   Again: Pt seen and examined; ok medically for dc when accepting facility established, discussed with case 2018 ; no new clinical issues. Assessment & Plan:     Brain mass, suggestive of high-grade Glioma  Repeat CT on admission showed significant interval increase in the size of large right temporoparietal heterogenous mass with solid and cystic component suspicious for high-grade lymphoma   Neuro surgery consulted, its large cystic mass, not operable per NS  Decadron,Keppra & mannitol- palliative following:  -  Family agreed on discharge on Hospice. Continue decadron. Schizophrenia- Haldol, depakot  Poor insight and judgement- Refusing tx, wants to leave AMA -TDO obtained   DM SSI  HLD Cont statin    Code status:Full   DVT prophylaxis: SCD  Disposition: Hospice     Hospital Problems  Date Reviewed: 2016          Codes Class Noted POA    * (Principal) Brain mass ICD-10-CM: G93.9  ICD-9-CM: 348.9  2018 Yes                Review of Systems:    pt denies cp, sob, n/v/d/f/chills,       Vital Signs:    Last 24hrs VS reviewed since prior progress note.  Most recent are:  Visit Vitals  /80 (BP 1 Location: Left arm, BP Patient Position: Sitting)   Pulse 62   Temp 97.3 °F (36.3 °C)   Resp 16   Ht 5' 8\" (1.727 m)   Wt 59.9 kg (132 lb)   SpO2 100%   BMI 20.07 kg/m²         Intake/Output Summary (Last 24 hours) at 12/12/2018 1340  Last data filed at 12/12/2018 0943  Gross per 24 hour   Intake 120 ml   Output    Net 120 ml        Physical Examination:             Constitutional:  No acute distress, pleasant   Stable exam 12/12/2018     Resp:  CTA bilaterally. No wheezing/rhonchi/rales. No accessory muscle use   CV:  Regular rhythm, normal rate, no murmurs,    GI:  Soft, non distended, non tender. normoactive bowel sounds    Musculoskeletal:  No edema, warm    Neurologic:  Moves all extremities. AAOx2     Psych:  poor insight and jugdement       Data Review:    Review and/or order of clinical lab test      Labs:     No results for input(s): WBC, HGB, HCT, PLT, HGBEXT, HCTEXT, PLTEXT, HGBEXT, HCTEXT, PLTEXT in the last 72 hours. No results for input(s): NA, K, CL, CO2, BUN, CREA, GLU, CA, MG, PHOS, URICA in the last 72 hours. No results for input(s): SGOT, GPT, ALT, AP, TBIL, TBILI, TP, ALB, GLOB, GGT, AML, LPSE in the last 72 hours. No lab exists for component: AMYP, HLPSE  No results for input(s): INR, PTP, APTT in the last 72 hours. No lab exists for component: INREXT, INREXT   No results for input(s): FE, TIBC, PSAT, FERR in the last 72 hours. No results found for: FOL, RBCF   No results for input(s): PH, PCO2, PO2 in the last 72 hours. No results for input(s): CPK, CKNDX, TROIQ in the last 72 hours.     No lab exists for component: CPKMB  No results found for: CHOL, CHOLX, CHLST, CHOLV, HDL, LDL, LDLC, DLDLP, TGLX, TRIGL, TRIGP, CHHD, CHHDX  Lab Results   Component Value Date/Time    Glucose (POC) 115 (H) 12/12/2018 11:21 AM    Glucose (POC) 93 12/12/2018 06:12 AM    Glucose (POC) 207 (H) 12/11/2018 09:30 PM    Glucose (POC) 153 (H) 12/11/2018 04:08 PM    Glucose (POC) 131 (H) 12/11/2018 12:35 PM     Lab Results   Component Value Date/Time    Color YELLOW/STRAW 08/21/2018 07:30 PM    Appearance CLEAR 08/21/2018 07:30 PM    Specific gravity 1.015 08/21/2018 07:30 PM    pH (UA) 6.5 08/21/2018 07:30 PM    Protein NEGATIVE  08/21/2018 07:30 PM    Glucose NEGATIVE  08/21/2018 07:30 PM    Ketone NEGATIVE  08/21/2018 07:30 PM    Bilirubin NEGATIVE  08/21/2018 07:30 PM    Urobilinogen 0.2 08/21/2018 07:30 PM    Nitrites NEGATIVE  08/21/2018 07:30 PM    Leukocyte Esterase NEGATIVE  08/21/2018 07:30 PM    Epithelial cells FEW 08/21/2018 07:30 PM    Bacteria NEGATIVE  08/21/2018 07:30 PM    WBC 0-4 08/21/2018 07:30 PM    RBC 0-5 08/21/2018 07:30 PM         Medications Reviewed:     Current Facility-Administered Medications   Medication Dose Route Frequency    dexamethasone (DECADRON) tablet 2 mg  2 mg Oral BID WITH MEALS    influenza vaccine 2018-19 (6 mos+)(PF) (FLUARIX QUAD/FLULAVAL QUAD) injection 0.5 mL  0.5 mL IntraMUSCular PRIOR TO DISCHARGE    lisinopril (PRINIVIL, ZESTRIL) tablet 10 mg  10 mg Oral DAILY    cloNIDine HCl (CATAPRES) tablet 0.1 mg  0.1 mg Oral Q4H PRN    sodium chloride (NS) flush 5-10 mL  5-10 mL IntraVENous PRN    benztropine (COGENTIN) tablet 1 mg  1 mg Oral BID    divalproex DR (DEPAKOTE) tablet 500 mg  500 mg Oral QHS    divalproex ER (DEPAKOTE ER) 24 hour tablet 250 mg  250 mg Oral DAILY    docusate sodium (COLACE) capsule 100 mg  100 mg Oral BID    haloperidol (HALDOL) tablet 5 mg  5 mg Oral BID    insulin regular (NOVOLIN R, HUMULIN R) injection   SubCUTAneous AC&HS    glucose chewable tablet 16 g  4 Tab Oral PRN    dextrose (D50W) injection syrg 12.5-25 g  12.5-25 g IntraVENous PRN    glucagon (GLUCAGEN) injection 1 mg  1 mg IntraMUSCular PRN    pantoprazole (PROTONIX) tablet 40 mg  40 mg Oral ACB    levETIRAcetam (KEPPRA) tablet 1,000 mg  1,000 mg Oral BID     ______________________________________________________________________  EXPECTED LENGTH OF STAY: 4d 12h  ACTUAL LENGTH OF STAY:          Freda Boyce MD

## 2018-12-12 NOTE — PROGRESS NOTES
CM faxed successfully processed [facility] to [facility]. CM called facility and confirmed receipt. Message left to have [facility] return call following review. Hopefully, patient can be admitted to the facility today or tomorrow. IVIS Rob, CRM    3pm.  CM called facility to discuss discharge today. Facility Hanna Isael) states she was not comfortable with the 20 page electronic assessment which was processed through the Medicaid portal.  She requested the Old document (14 pages) and stated she would need this document for  compliance. CM explained to her again that the old form was not used by hospital care managers. She faxed the document to this CM stating that after she reviewed document, she would  accept patient. 6:11p  CM called [facility]. She confirms receipt of 14 page assessment. She will return call to this CM regarding admission to the facility tomorrow morning. CM awaiting response. IVIS Rob, CRM    6:42p  CM received call from [place]. Patient approved for admission to the facility ([facility] on Thursday 12/13/18. Cab transport (door-to door) requested for 11:30am Thursday. Referral sent to [facility].

## 2018-12-13 VITALS
TEMPERATURE: 98.1 F | DIASTOLIC BLOOD PRESSURE: 84 MMHG | OXYGEN SATURATION: 100 % | WEIGHT: 132 LBS | RESPIRATION RATE: 18 BRPM | BODY MASS INDEX: 20 KG/M2 | HEIGHT: 68 IN | HEART RATE: 73 BPM | SYSTOLIC BLOOD PRESSURE: 129 MMHG

## 2018-12-13 LAB
GLUCOSE BLD STRIP.AUTO-MCNC: 88 MG/DL (ref 65–100)
SERVICE CMNT-IMP: NORMAL

## 2018-12-13 PROCEDURE — 74011250637 HC RX REV CODE- 250/637: Performed by: HOSPITALIST

## 2018-12-13 PROCEDURE — 74011250637 HC RX REV CODE- 250/637: Performed by: NURSE PRACTITIONER

## 2018-12-13 PROCEDURE — 74011250637 HC RX REV CODE- 250/637: Performed by: SPECIALIST

## 2018-12-13 PROCEDURE — 74011250636 HC RX REV CODE- 250/636: Performed by: INTERNAL MEDICINE

## 2018-12-13 PROCEDURE — 82962 GLUCOSE BLOOD TEST: CPT

## 2018-12-13 RX ADMIN — DIVALPROEX SODIUM 250 MG: 250 TABLET, EXTENDED RELEASE ORAL at 09:22

## 2018-12-13 RX ADMIN — DEXAMETHASONE 2 MG: 4 TABLET ORAL at 08:00

## 2018-12-13 RX ADMIN — HALOPERIDOL 5 MG: 5 TABLET ORAL at 09:22

## 2018-12-13 RX ADMIN — LEVETIRACETAM 1000 MG: 500 TABLET ORAL at 09:22

## 2018-12-13 RX ADMIN — PANTOPRAZOLE SODIUM 40 MG: 40 TABLET, DELAYED RELEASE ORAL at 06:34

## 2018-12-13 RX ADMIN — DOCUSATE SODIUM 100 MG: 100 CAPSULE, LIQUID FILLED ORAL at 09:22

## 2018-12-13 RX ADMIN — BENZTROPINE MESYLATE 1 MG: 1 TABLET ORAL at 09:22

## 2018-12-13 RX ADMIN — LISINOPRIL 10 MG: 10 TABLET ORAL at 09:22

## 2018-12-13 NOTE — DISCHARGE SUMMARY
Discharge Summary       PATIENT ID: Joana Rodas  MRN: [id number]   YOB: 1965    DATE OF ADMISSION: 11/24/2018  5:49 PM    DATE OF DISCHARGE: 12/13/2018    PRIMARY CARE PROVIDER: Unknown, Provider     ATTENDING PHYSICIAN: Erma Olsen MD   DISCHARGING PROVIDER: Erma Olsen MD    To contact this individual call [phone] and ask the  to page. If unavailable ask to be transferred the Adult Hospitalist Department. CONSULTATIONS: IP CONSULT TO NEUROSURGERY  IP CONSULT TO PSYCHIATRY    PROCEDURES/SURGERIES: * No surgery found *    ADMITTING DIAGNOSES & HOSPITAL COURSE:     ADDITIONAL CARE RECOMMENDATIONS:  -Need follow up appointment with PCP within 7 days of discharge  -Stay in contact with family to transition to hospice   --Patient's mother Sanket Obrien and cousin Karrie Dickerson are health care agents: [phone]  -New prescriptions for Keppra and dexamethasone.            Admission Summary:   57-year-old gentleman who has history of schizophrenia, currently resides at [facility], was sent as he has been seen by the staff with slurred speech and left-sided weakness off and on.  08/2018 he had a CAT scan of the head done which showed a mass suggestive of high-grade glioma.     Interval history / Subjective:   Again: Pt seen and examined; ok medically for dc when accepting facility established, discussed with case 12/12/2018 ; no new clinical issues.       Assessment & Plan:      Brain mass, suggestive of high-grade Glioma  Repeat CT on admission showed significant interval increase in the size of large right temporoparietal heterogenous mass with solid and cystic component suspicious for high-grade lymphoma   Neuro surgery consulted, its large cystic mass, not operable per NS  Decadron,Keppra & mannitol- palliative following:  -  Family agreed on discharge on Hospice.  Continue decadron.      Schizophrenia- Haldol, depakot  Poor insight and judgement- Refusing tx, wants to leave AMA -TDO obtained 11/25  DM SSI  HLD Cont statin     Code status:Full   DVT prophylaxis: SCD  Disposition: [address] Problems  Date Reviewed: 9/27/2016           Codes Class Noted POA     * (Principal) Brain mass ICD-10-CM: G93.9  ICD-9-CM: 611. 9   11/24/2018 Yes                      Review of Systems:    pt denies cp, sob, n/v/d/f/chills,         Vital Signs:    Last 24hrs VS reviewed since prior progress note. Most recent are:  Visit Vitals  /80 (BP 1 Location: Left arm, BP Patient Position: Sitting)   Pulse 62   Temp 97.3 °F (36.3 °C)   Resp 16   Ht 5' 8\" (1.727 m)   Wt 59.9 kg (132 lb)   SpO2 100%   BMI 20.07 kg/m²            Intake/Output Summary (Last 24 hours) at 12/12/2018 1340  Last data filed at 12/12/2018 0943      Gross per 24 hour   Intake 120 ml   Output    Net 120 ml         Physical Examination:                                                       Constitutional:  No acute distress, pleasant   Stable exam 12/12/2018      Resp:  CTA bilaterally. No wheezing/rhonchi/rales. No accessory muscle use   CV:  Regular rhythm, normal rate, no murmurs,    GI:  Soft, non distended, non tender. normoactive bowel sounds    Musculoskeletal:  No edema, warm    Neurologic:  Moves all extremities. AAOx2                         Psych:  poor insight and jugdement         Data Review:    Review and/or order of clinical lab test        Labs:      No results for input(s): WBC, HGB, HCT, PLT, HGBEXT, HCTEXT, PLTEXT, HGBEXT, HCTEXT, PLTEXT in the last 72 hours. No results for input(s): NA, K, CL, CO2, BUN, CREA, GLU, CA, MG, PHOS, URICA in the last 72 hours. No results for input(s): SGOT, GPT, ALT, AP, TBIL, TBILI, TP, ALB, GLOB, GGT, AML, LPSE in the last 72 hours.     No lab exists for component: AMYP, HLPSE  No results for input(s): INR, PTP, APTT in the last 72 hours.     No lab exists for component: INREXT, INREXT   No results for input(s): FE, TIBC, PSAT, FERR in the last 72 hours.    No results found for: FOL, RBCF   No results for input(s): PH, PCO2, PO2 in the last 72 hours.   No results for input(s): CPK, CKNDX, TROIQ in the last 72 hours.     No lab exists for component: CPKMB  No results found for: CHOL, CHOLX, CHLST, CHOLV, HDL, LDL, LDLC, DLDLP, TGLX, TRIGL, TRIGP, CHHD, CHHDX        Lab Results   Component Value Date/Time     Glucose (POC) 115 (H) 12/12/2018 11:21 AM     Glucose (POC) 93 12/12/2018 06:12 AM     Glucose (POC) 207 (H) 12/11/2018 09:30 PM     Glucose (POC) 153 (H) 12/11/2018 04:08 PM     Glucose (POC) 131 (H) 12/11/2018 12:35 PM            Lab Results   Component Value Date/Time     Color YELLOW/STRAW 08/21/2018 07:30 PM     Appearance CLEAR 08/21/2018 07:30 PM     Specific gravity 1.015 08/21/2018 07:30 PM     pH (UA) 6.5 08/21/2018 07:30 PM     Protein NEGATIVE  08/21/2018 07:30 PM     Glucose NEGATIVE  08/21/2018 07:30 PM     Ketone NEGATIVE  08/21/2018 07:30 PM     Bilirubin NEGATIVE  08/21/2018 07:30 PM     Urobilinogen 0.2 08/21/2018 07:30 PM     Nitrites NEGATIVE  08/21/2018 07:30 PM     Leukocyte Esterase NEGATIVE  08/21/2018 07:30 PM     Epithelial cells FEW 08/21/2018 07:30 PM     Bacteria NEGATIVE  08/21/2018 07:30 PM     WBC 0-4 08/21/2018 07:30 PM     RBC 0-5 08/21/2018 07:30 PM            Medications Reviewed:             Current Facility-Administered Medications   Medication Dose Route Frequency    dexamethasone (DECADRON) tablet 2 mg  2 mg Oral BID WITH MEALS    influenza vaccine 2018-19 (6 mos+)(PF) (FLUARIX QUAD/FLULAVAL QUAD) injection 0.5 mL  0.5 mL IntraMUSCular PRIOR TO DISCHARGE    lisinopril (PRINIVIL, ZESTRIL) tablet 10 mg  10 mg Oral DAILY    cloNIDine HCl (CATAPRES) tablet 0.1 mg  0.1 mg Oral Q4H PRN    sodium chloride (NS) flush 5-10 mL  5-10 mL IntraVENous PRN    benztropine (COGENTIN) tablet 1 mg  1 mg Oral BID    divalproex DR (DEPAKOTE) tablet 500 mg  500 mg Oral QHS    divalproex ER (DEPAKOTE ER) 24 hour tablet 250 mg  250 mg Oral DAILY  docusate sodium (COLACE) capsule 100 mg  100 mg Oral BID    haloperidol (HALDOL) tablet 5 mg  5 mg Oral BID    insulin regular (NOVOLIN R, HUMULIN R) injection   SubCUTAneous AC&HS    glucose chewable tablet 16 g  4 Tab Oral PRN    dextrose (D50W) injection syrg 12.5-25 g  12.5-25 g IntraVENous PRN    glucagon (GLUCAGEN) injection 1 mg  1 mg IntraMUSCular PRN    pantoprazole (PROTONIX) tablet 40 mg  40 mg Oral ACB    levETIRAcetam (KEPPRA) tablet 1,000 mg  1,000 mg                 DISCHARGE DIAGNOSES / PLAN:      1.  as above        PENDING TEST RESULTS:   At the time of discharge the following test results are still pending: na    FOLLOW UP APPOINTMENTS:    Follow-up Information     Follow up With Specialties Details Why Contact Info    Unknown, Provider    Patient not available to ask             ADDITIONAL CARE RECOMMENDATIONS:     ADDITIONAL CARE RECOMMENDATIONS:  -Need follow up appointment with PCP within 7 days of discharge  -Stay in contact with family to transition to hospice   --Patient's mother Jeff Garcia and cousin Kian Davis are health care agents: [phone]  -New prescriptions for Keppra and dexamethasone. DIET: Regular Diet  Oral Nutritional Supplements: Ensure Complete or Ensure PlusTwice daily    ACTIVITY: Activity as tolerated    WOUND CARE: na    EQUIPMENT needed: na      DISCHARGE MEDICATIONS:  Current Discharge Medication List      START taking these medications    Details   dexamethasone (DECADRON) 2 mg tablet Take 1 Tab by mouth two (2) times daily (with meals). Qty: 60 Tab, Refills: 0      levETIRAcetam (KEPPRA) 1,000 mg tablet Take 1 Tab by mouth two (2) times a day. Qty: 60 Tab, Refills: 0         CONTINUE these medications which have NOT CHANGED    Details   benztropine (COGENTIN) 1 mg tablet Take 1 mg by mouth two (2) times a day.       divalproex DR (DEPAKOTE) 250 mg tablet Take 500 mg by mouth nightly.      haloperidol (HALDOL) 5 mg tablet Take 5 mg by mouth two (2) times a day. Omeprazole delayed release (PRILOSEC D/R) 20 mg tablet Take 20 mg by mouth daily. senna (SENNA) 8.6 mg tablet Take 1 Tab by mouth daily as needed for Constipation. lactulose (CHRONULAC) 10 gram/15 mL solution Take  by mouth daily as needed for Other. ibuprofen (MOTRIN) 400 mg tablet Take  by mouth every six (6) hours as needed for Pain. docusate sodium (COLACE) 100 mg capsule Take 100 mg by mouth two (2) times a day. cholecalciferol (VITAMIN D3) 1,000 unit tablet Take 1,000 Units by mouth daily. psyllium husk (METAMUCIL PO) Take  by mouth two (2) times a day. lisinopril (PRINIVIL, ZESTRIL) 10 mg tablet Take 10 mg by mouth daily. STOP taking these medications       divalproex ER (DEPAKOTE ER) 250 mg ER tablet Comments:   Reason for Stopping:         PSEUDOEPHEDRINE-dextromethorphan-guaiFENesin (ROBAFEN CF) 30- mg/5 mL solution Comments:   Reason for Stopping:         carbamide peroxide (DEBROX) 6.5 % otic solution Comments:   Reason for Stopping:         metFORMIN (GLUCOPHAGE) 1,000 mg tablet Comments:   Reason for Stopping:         traZODone (DESYREL) 100 mg tablet Comments:   Reason for Stopping:         ziprasidone hcl (GEODON) 80 mg capsule Comments:   Reason for Stopping:         simvastatin (ZOCOR) 10 mg tablet Comments:   Reason for Stopping:                 NOTIFY YOUR PHYSICIAN FOR ANY OF THE FOLLOWING:   Fever over 101 degrees for 24 hours. Chest pain, shortness of breath, fever, chills, nausea, vomiting, diarrhea, change in mentation, falling, weakness, bleeding. Severe pain or pain not relieved by medications. Or, any other signs or symptoms that you may have questions about.     DISPOSITION:    Home With:   OT  PT  HH  RN        Long term SNF/Inpatient Rehab   x  /assisted living    Hospice    Other:       PATIENT CONDITION AT DISCHARGE:     Functional status    Poor     Deconditioned    x Independent      Cognition     Lucid    x Forgetful     Dementia      Catheters/lines (plus indication)    Connell     PICC     PEG    x None      Code status     Full code    x DNR      PHYSICAL EXAMINATION AT DISCHARGE:   Refer to Progress Note  Visit Vitals  /84 (BP 1 Location: Right arm, BP Patient Position: Standing)   Pulse 73   Temp 98.1 °F (36.7 °C)   Resp 18   Ht 5' 8\" (1.727 m)   Wt 59.9 kg (132 lb)   SpO2 100%   BMI 20.07 kg/m²          CHRONIC MEDICAL DIAGNOSES:  Problem List as of 12/13/2018 Date Reviewed: 9/27/2016          Codes Class Noted - Resolved    * (Principal) Brain mass ICD-10-CM: G93.9  ICD-9-CM: 348.9  11/24/2018 - Present        Perirectal abscess ICD-10-CM: K61.1  ICD-9-CM: 027  9/27/2016 - Present              Greater than  20  minutes were spent with the patient on counseling and coordination of care    Signed:   Effie Townsend MD  12/13/2018  8:59 AM

## 2018-12-13 NOTE — PROGRESS NOTES
CM received text for transport at 11:15a. Insurance authorized trip last night. Patient was at the d/c lot 5 minutes prior to pick-up time. No  available. Patient brought back to the unit after 30 minutes. CM called [facility] [phone]. Transport assigned to [facility] ([phone]). 12p   [employer] taxi) called the unit (speaking with unit secretary) stating he was on the way to transport patient.   Ozzie Arredondo, EVEW, CRM

## 2018-12-13 NOTE — PROGRESS NOTES
I have reviewed discharge instructions with the patient. The patient verbalized understanding. Gave report to Jaú at the [facility]. Report consisted of SBAR, kardex, and recent results.

## 2018-12-13 NOTE — PROGRESS NOTES
Problem: Falls - Risk of  Goal: *Absence of Falls  Document Darian Fall Risk and appropriate interventions in the flowsheet.   Outcome: Progressing Towards Goal  Fall Risk Interventions:  Mobility Interventions: Communicate number of staff needed for ambulation/transfer    Mentation Interventions: Adequate sleep, hydration, pain control    Medication Interventions: Evaluate medications/consider consulting pharmacy, Teach patient to arise slowly    Elimination Interventions: Call light in reach    History of Falls Interventions: Consult care management for discharge planning, Door open when patient unattended, Room close to nurse's station

## 2018-12-13 NOTE — DISCHARGE INSTRUCTIONS
Discharge Instructions       PATIENT ID: Sergio Brunson  MRN: [id number]   YOB: 1965    DATE OF ADMISSION: 11/24/2018  5:49 PM    DATE OF DISCHARGE: 12/10/2018    PRIMARY CARE PROVIDER: Unknown, Provider     ATTENDING PHYSICIAN: Thom Kurtz DO  DISCHARGING PROVIDER: [address], DO    To contact this individual call [phone] and ask the  to page. If unavailable ask to be transferred the Adult Hospitalist Department. DISCHARGE DIAGNOSES   -New prescriptions: Keppra, decadron  -New medication list based on tolerated medications during hospital stay. -Patient's mother Monique Lou and shavonnesin Vera Addison are health care agents: [phone]    CONSULTATIONS: IP CONSULT TO NEUROSURGERY  IP CONSULT TO PSYCHIATRY    PROCEDURES/SURGERIES: * No surgery found *    PENDING TEST RESULTS:   At the time of discharge the following test results are still pending: none    FOLLOW UP APPOINTMENTS:   Follow-up Information     Follow up With Specialties Details Why Contact Info    Unknown, Provider    Patient not available to ask             ADDITIONAL CARE RECOMMENDATIONS:  -Need follow up appointment with PCP within 7 days of discharge  -Stay in contact with family to transition to hospice   --Patient's mother Monique Lou and taniya Addison are health care agents: [phone]  -New prescriptions for Keppra and dexamethasone. DIET: Regular Diet  As listed below    ACTIVITY: Activity as tolerated    WOUND CARE: none    EQUIPMENT needed: none      DISCHARGE MEDICATIONS:   See Medication Reconciliation Form    · It is important that you take the medication exactly as they are prescribed. · Keep your medication in the bottles provided by the pharmacist and keep a list of the medication names, dosages, and times to be taken in your wallet. · Do not take other medications without consulting your doctor.        NOTIFY YOUR PHYSICIAN FOR ANY OF THE FOLLOWING:   Fever over 101 degrees for 24 hours.   Chest pain, shortness of breath, fever, chills, nausea, vomiting, diarrhea, change in mentation, falling, weakness, bleeding. Severe pain or pain not relieved by medications. Or, any other signs or symptoms that you may have questions about. DISPOSITION:   x Home With:   OT  PT  HH  RN       SNF/Inpatient Rehab/LTAC    Independent/assisted living    Hospice    Other:     CDMP Checked:   Yes x     PROBLEM LIST Updated:  Yes x       Signed:   [address] [facility], DO  12/10/2018  10:36 AM    Up dated 12/13/2018 Marlee Montiel MD     Nutrition Recommendations for Discharge:             Continue Oral Nutrition Supplements at discharge:   Ensure High Protein for Muscle Health, Glucerna Shake or similar product Twice daily for 30 days unless otherwise directed by your Primary Care Physician. This product can be purchased at your local grocery store, pharmacy and/or online.       Dmitriy Rodriguez RD, MS, CDE

## 2022-08-05 NOTE — PROGRESS NOTES
Verbal shift change report given to Pat Muniz (oncoming nurse) by Robert Ward (offgoing nurse). Report included the following information SBAR shift updates. No

## 2024-04-26 NOTE — PROGRESS NOTES
Hospitalist Progress Note Yudy Pearson MD 
Answering service: [phone] OR [phone] from in house phone Date of Service:  2018 NAME:  Ewell Snellen :  1965 MRN:  [id number] Admission Summary:  
53-year-old gentleman who has history of schizophrenia, currently resides at [facility], was sent as he has been seen by the staff with slurred speech and left-sided weakness off and on. 
2018 he had a CAT scan of the head done which showed a mass suggestive of high-grade glioma. Interval history / Subjective:  
 pt has been non compliant,pt wants to leave. Moving off ICU, Family agreed on hospice discharge Assessment & Plan:  
 
Brain mass,suggestive of high-grade Glioma Repeat CT on admission showed significant interval increase in the size of large right temporoparietal heterogenous mass with solid and cystic component suspicious for high-grade lymphoma Neuro surgery consulted, its large cystic mass, not operable per NS Decadron,Keppra & mannitol- palliative following: Family agreed on discharge on Hospice. Schizophrenia- Haldol, depakot Poor insight and judgement- Refusing tx, wants to leave AMA -TDO obtained  DM SSI 
HLD Cont statin Code status:Full DVT prophylaxis: SCD Disposition: Hospice Hospital Problems  Date Reviewed: 2016 Codes Class Noted POA * (Principal) Brain mass ICD-10-CM: G93.9 ICD-9-CM: 348.9  2018 Yes Review of Systems: A comprehensive review of systems was negative except for that written in the HPI. Vital Signs:  
 Last 24hrs VS reviewed since prior progress note. Most recent are: 
Visit Vitals /75 (BP 1 Location: Right arm, BP Patient Position: At rest) Pulse 60 Temp 98.9 °F (37.2 °C) Resp 16 Ht 5' 8\" (1.727 m) Wt 54.6 kg (120 lb 5.9 oz) SpO2 93% BMI 18.30 kg/m² Intake/Output Summary (Last 24 hours) at 11/28/2018 1738 Last data filed at 11/28/2018 1200 Gross per 24 hour Intake 420 ml Output 300 ml Net 120 ml Physical Examination:  
 
 
     
Constitutional:  No acute distress, pleasant Resp:  CTA bilaterally. No wheezing/rhonchi/rales. No accessory muscle use CV:  Regular rhythm, normal rate, no murmurs, GI:  Soft, non distended, non tender. normoactive bowel sounds Musculoskeletal:  No edema, warm, 2+ pulses throughout Neurologic:  Moves all extremities. AAOx2, Psych:  poor insight and jugdement Data Review:  
 Review and/or order of clinical lab test 
 
 
Labs:  
 
No results for input(s): WBC, HGB, HCT, PLT, HGBEXT, HCTEXT, PLTEXT, HGBEXT, HCTEXT, PLTEXT in the last 72 hours. No results for input(s): NA, K, CL, CO2, BUN, CREA, GLU, CA, MG, PHOS, URICA in the last 72 hours. No results for input(s): SGOT, GPT, ALT, AP, TBIL, TBILI, TP, ALB, GLOB, GGT, AML, LPSE in the last 72 hours. No lab exists for component: AMYP, HLPSE No results for input(s): INR, PTP, APTT in the last 72 hours. No lab exists for component: INREXT, INREXT No results for input(s): FE, TIBC, PSAT, FERR in the last 72 hours. No results found for: FOL, RBCF No results for input(s): PH, PCO2, PO2 in the last 72 hours. No results for input(s): CPK, CKNDX, TROIQ in the last 72 hours. No lab exists for component: CPKMB No results found for: CHOL, CHOLX, CHLST, CHOLV, HDL, LDL, LDLC, DLDLP, TGLX, TRIGL, TRIGP, CHHD, CHHDX Lab Results Component Value Date/Time Glucose (POC) 141 (H) 11/28/2018 04:34 PM  
 Glucose (POC) 99 11/28/2018 12:01 PM  
 Glucose (POC) 121 (H) 11/28/2018 06:42 AM  
 Glucose (POC) 140 (H) 11/27/2018 10:35 PM  
 Glucose (POC) 194 (H) 11/27/2018 04:22 PM  
 
Lab Results Component Value Date/Time  Color YELLOW/STRAW 08/21/2018 07:30 PM  
 Appearance CLEAR 08/21/2018 07:30 PM  
 Specific gravity 1.015 08/21/2018 07:30 PM  
 pH (UA) 6.5 08/21/2018 07:30 PM  
 Protein NEGATIVE  08/21/2018 07:30 PM  
 Glucose NEGATIVE  08/21/2018 07:30 PM  
 Ketone NEGATIVE  08/21/2018 07:30 PM  
 Bilirubin NEGATIVE  08/21/2018 07:30 PM  
 Urobilinogen 0.2 08/21/2018 07:30 PM  
 Nitrites NEGATIVE  08/21/2018 07:30 PM  
 Leukocyte Esterase NEGATIVE  08/21/2018 07:30 PM  
 Epithelial cells FEW 08/21/2018 07:30 PM  
 Bacteria NEGATIVE  08/21/2018 07:30 PM  
 WBC 0-4 08/21/2018 07:30 PM  
 RBC 0-5 08/21/2018 07:30 PM  
 
 
 
Medications Reviewed:  
 
Current Facility-Administered Medications Medication Dose Route Frequency  influenza vaccine 2018-19 (6 mos+)(PF) (FLUARIX QUAD/FLULAVAL QUAD) injection 0.5 mL  0.5 mL IntraMUSCular PRIOR TO DISCHARGE  lisinopril (PRINIVIL, ZESTRIL) tablet 10 mg  10 mg Oral DAILY  cloNIDine HCl (CATAPRES) tablet 0.1 mg  0.1 mg Oral Q4H PRN  
 sodium chloride (NS) flush 5-10 mL  5-10 mL IntraVENous Q8H  
 sodium chloride (NS) flush 5-10 mL  5-10 mL IntraVENous PRN  
 benztropine (COGENTIN) tablet 1 mg  1 mg Oral BID  divalproex DR (DEPAKOTE) tablet 500 mg  500 mg Oral QHS  divalproex ER (DEPAKOTE ER) 24 hour tablet 250 mg  250 mg Oral DAILY  docusate sodium (COLACE) capsule 100 mg  100 mg Oral BID  
 haloperidol (HALDOL) tablet 5 mg  5 mg Oral BID  insulin regular (NOVOLIN R, HUMULIN R) injection   SubCUTAneous AC&HS  
 glucose chewable tablet 16 g  4 Tab Oral PRN  
 dextrose (D50W) injection syrg 12.5-25 g  12.5-25 g IntraVENous PRN  
 glucagon (GLUCAGEN) injection 1 mg  1 mg IntraMUSCular PRN  pantoprazole (PROTONIX) tablet 40 mg  40 mg Oral ACB  levETIRAcetam (KEPPRA) tablet 1,000 mg  1,000 mg Oral BID  dexamethasone (DECADRON) tablet 6 mg  6 mg Oral Q6H  
 
______________________________________________________________________ EXPECTED LENGTH OF STAY: 4d 12h ACTUAL LENGTH OF STAY:          [address] [place], [place]  
 Suicidal ideation with plan/means